# Patient Record
Sex: FEMALE | Employment: FULL TIME | URBAN - METROPOLITAN AREA
[De-identification: names, ages, dates, MRNs, and addresses within clinical notes are randomized per-mention and may not be internally consistent; named-entity substitution may affect disease eponyms.]

---

## 2018-10-10 ENCOUNTER — HOSPITAL ENCOUNTER (OUTPATIENT)
Age: 58
Setting detail: SPECIMEN
Discharge: HOME | End: 2018-10-10
Payer: COMMERCIAL

## 2018-10-10 LAB
-: ABNORMAL
AMORPHOUS: ABNORMAL
BACTERIA: ABNORMAL
CASTS UA: ABNORMAL /LPF (ref 0–8)
CRYSTALS, UA: ABNORMAL /HPF
EPITHELIAL CELLS UA: ABNORMAL /HPF (ref 0–5)
MUCUS: ABNORMAL
OTHER OBSERVATIONS UA: ABNORMAL
RBC UA: ABNORMAL /HPF (ref 0–4)
RENAL EPITHELIAL, UA: ABNORMAL /HPF
TRICHOMONAS: ABNORMAL
WBC UA: ABNORMAL /HPF (ref 0–5)
YEAST: ABNORMAL

## 2018-10-12 LAB
CULTURE: ABNORMAL
Lab: ABNORMAL
ORGANISM: ABNORMAL
SPECIMEN DESCRIPTION: ABNORMAL
STATUS: ABNORMAL

## 2019-03-26 ENCOUNTER — OFFICE VISIT (OUTPATIENT)
Dept: FAMILY MEDICINE CLINIC | Age: 59
End: 2019-03-26
Payer: COMMERCIAL

## 2019-03-26 VITALS
BODY MASS INDEX: 24.07 KG/M2 | DIASTOLIC BLOOD PRESSURE: 64 MMHG | WEIGHT: 141 LBS | OXYGEN SATURATION: 98 % | TEMPERATURE: 97.4 F | HEART RATE: 81 BPM | HEIGHT: 64 IN | RESPIRATION RATE: 16 BRPM | SYSTOLIC BLOOD PRESSURE: 110 MMHG

## 2019-03-26 DIAGNOSIS — F41.0 PANIC DISORDER: ICD-10-CM

## 2019-03-26 DIAGNOSIS — N39.41 URGE INCONTINENCE: ICD-10-CM

## 2019-03-26 DIAGNOSIS — G47.9 SLEEP DISTURBANCE: ICD-10-CM

## 2019-03-26 DIAGNOSIS — F41.9 ANXIETY: Primary | ICD-10-CM

## 2019-03-26 DIAGNOSIS — N32.81 OVERACTIVE BLADDER: ICD-10-CM

## 2019-03-26 PROCEDURE — 99203 OFFICE O/P NEW LOW 30 MIN: CPT | Performed by: INTERNAL MEDICINE

## 2019-03-26 RX ORDER — ZOLPIDEM TARTRATE 10 MG/1
TABLET ORAL
COMMUNITY
Start: 2019-02-28 | End: 2019-09-13 | Stop reason: SDUPTHER

## 2019-03-26 RX ORDER — OXYBUTYNIN CHLORIDE 10 MG/1
TABLET, EXTENDED RELEASE ORAL
COMMUNITY
Start: 2019-03-20 | End: 2020-03-31 | Stop reason: SDUPTHER

## 2019-03-26 RX ORDER — CITALOPRAM 20 MG/1
20 TABLET ORAL
COMMUNITY
Start: 2018-12-06 | End: 2019-03-26 | Stop reason: ALTCHOICE

## 2019-03-26 RX ORDER — BUSPIRONE HYDROCHLORIDE 15 MG/1
TABLET ORAL
COMMUNITY
Start: 2019-03-20 | End: 2019-03-26 | Stop reason: ALTCHOICE

## 2019-03-26 ASSESSMENT — PATIENT HEALTH QUESTIONNAIRE - PHQ9
2. FEELING DOWN, DEPRESSED OR HOPELESS: 0
SUM OF ALL RESPONSES TO PHQ QUESTIONS 1-9: 0
SUM OF ALL RESPONSES TO PHQ QUESTIONS 1-9: 0
SUM OF ALL RESPONSES TO PHQ9 QUESTIONS 1 & 2: 0
1. LITTLE INTEREST OR PLEASURE IN DOING THINGS: 0

## 2019-03-28 ENCOUNTER — TELEPHONE (OUTPATIENT)
Dept: FAMILY MEDICINE CLINIC | Age: 59
End: 2019-03-28

## 2019-03-28 RX ORDER — SERTRALINE HYDROCHLORIDE 25 MG/1
25 TABLET, FILM COATED ORAL DAILY
Qty: 30 TABLET | Refills: 3 | Status: SHIPPED | OUTPATIENT
Start: 2019-03-28 | End: 2019-04-23 | Stop reason: ALTCHOICE

## 2019-03-28 ASSESSMENT — ENCOUNTER SYMPTOMS
STRIDOR: 0
SHORTNESS OF BREATH: 0
CONSTIPATION: 0
COUGH: 0
DIARRHEA: 0
CHEST TIGHTNESS: 0
ABDOMINAL PAIN: 0
VOMITING: 0
NAUSEA: 0
BLOOD IN STOOL: 0

## 2019-04-23 ENCOUNTER — OFFICE VISIT (OUTPATIENT)
Dept: FAMILY MEDICINE CLINIC | Age: 59
End: 2019-04-23
Payer: COMMERCIAL

## 2019-04-23 VITALS
SYSTOLIC BLOOD PRESSURE: 128 MMHG | HEART RATE: 84 BPM | WEIGHT: 141 LBS | TEMPERATURE: 96.9 F | OXYGEN SATURATION: 97 % | HEIGHT: 64 IN | RESPIRATION RATE: 18 BRPM | BODY MASS INDEX: 24.07 KG/M2 | DIASTOLIC BLOOD PRESSURE: 62 MMHG

## 2019-04-23 DIAGNOSIS — G47.9 SLEEP DISTURBANCE: Primary | ICD-10-CM

## 2019-04-23 DIAGNOSIS — F41.0 PANIC DISORDER: ICD-10-CM

## 2019-04-23 DIAGNOSIS — F41.9 ANXIETY: ICD-10-CM

## 2019-04-23 PROCEDURE — 99213 OFFICE O/P EST LOW 20 MIN: CPT | Performed by: INTERNAL MEDICINE

## 2019-04-23 ASSESSMENT — ENCOUNTER SYMPTOMS
CHEST TIGHTNESS: 0
WHEEZING: 0
VOICE CHANGE: 0
DIARRHEA: 0
SORE THROAT: 0
ABDOMINAL PAIN: 0
SHORTNESS OF BREATH: 0
TROUBLE SWALLOWING: 0
STRIDOR: 0
CONSTIPATION: 0
CHOKING: 0

## 2019-04-23 NOTE — PROGRESS NOTES
Visit Information    Have you changed or started any medications since your last visit including any over-the-counter medicines, vitamins, or herbal medicines? no   Are you having any side effects from any of your medications? -  no  Have you stopped taking any of your medications? Is so, why? -  no    Have you seen any other physician or provider since your last visit? No  Have you had any other diagnostic tests since your last visit? No  Have you been seen in the emergency room and/or had an admission to a hospital since we last saw you? No  Have you had your routine dental cleaning in the past 6 months? no    Have you activated your [employer] account? If not, what are your barriers?  Yes     Patient Care Team:  Day Eduardo DO as PCP - General (Family Medicine)    Medical History Review  Past Medical, Family, and Social History reviewed and does contribute to the patient presenting condition    Health Maintenance   Topic Date Due    Hepatitis C screen  1960    HIV screen  03/04/1974    Lipid screen  03/04/1999    Colon cancer screen colonoscopy  03/04/2009    Flu vaccine (Season Ended) 09/26/2019 (Originally 9/1/2019)    Shingles Vaccine (1 of 2) 03/26/2020 (Originally 3/4/2009)    Breast cancer screen  12/31/2020    Cervical cancer screen  11/15/2021    DTaP/Tdap/Td vaccine (2 - Td) 11/28/2026    Pneumococcal 0-64 years Vaccine  Aged Out

## 2019-04-23 NOTE — PROGRESS NOTES
[address] [address]  Dept: [phone]  Dept Fax: [phone]    Leodis Brittle a 60 y.o. female who presents today for her medical conditions/complaints as notedbelow. Colette Lr is c/o of   Chief Complaint   Patient presents with    Anxiety     pt states she is stable on her anxiety medication          HPI:     Doing somewhat better in terms of anxiety on the zoloft 25 mg   Much better than she has ever done on any other anxiety /depression medications   A lot less panic attacks   No /si /hi   Home stressors hane not change and will not for for seeable future  No other issues or complaints         Still doing the ambien at bedtime as well , nightly started by sleep medicine       No results found for: LABA1C      ( goal A1C is < 7)   No results found for: LABMICR  No results found for: LDLCHOLESTEROL, LDLCALC    (goal LDL is <100)   No results found for: AST, ALT, BUN  BP Readings from Last 3 Encounters:   04/23/19 128/62   03/26/19 110/64          (goal 120/80)    Past Medical History:   Diagnosis Date    Acute insomnia     Anxiety     Depression     MDRO (multiple drug resistant organisms) resistance 10/10/2018    E. Coli urine    Overactive bladder       No past surgical history on file. Family History   Problem Relation Age of Onset    No Known Problems Mother     Cancer Father     No Known Problems Sister     Chronic Infections Brother     Deep Vein Thrombosis Brother        Social History     Tobacco Use    Smoking status: Never Smoker    Smokeless tobacco: Never Used   Substance Use Topics    Alcohol use:  Yes     Alcohol/week: 3.6 oz     Types: 6 Cans of beer per week      Current Outpatient Medications   Medication Sig Dispense Refill    sertraline (ZOLOFT) 50 MG tablet Take 1 tablet by mouth daily 30 tablet 5    oxybutynin (DITROPAN XL) 10 MG extended release tablet Use two tablets daily Pulmonary/Chest: Effort normal and breath sounds normal.   Abdominal: Soft. Bowel sounds are normal. There is no splenomegaly or hepatomegaly. There is no tenderness. Neurological: She is alert and oriented to person, place, and time. No cranial nerve deficit. Skin: Skin is warm and dry. No rash noted. Psychiatric: She has a normal mood and affect. Her speech is normal and behavior is normal. Judgment normal. Thought content is not delusional. Cognition and memory are normal. She expresses no suicidal plans and no homicidal plans. Nursing note and vitals reviewed. /62 (Site: Left Upper Arm, Position: Sitting, Cuff Size: Medium Adult)   Pulse 84   Temp 96.9 °F (36.1 °C) (Tympanic)   Resp 18   Ht 5' 4\" (1.626 m)   Wt 141 lb (64 kg)   SpO2 97%   BMI 24.20 kg/m²     Assessment:       Diagnosis Orders   1. Sleep disturbance     2. Anxiety     3. Panic disorder               Plan:       Return in about 3 months (around 7/23/2019), or if symptoms worsen or fail to improve, for anxiety med check . No orders of the defined types were placed in this encounter. Orders Placed This Encounter   Medications    sertraline (ZOLOFT) 50 MG tablet     Sig: Take 1 tablet by mouth daily     Dispense:  30 tablet     Refill:  5    Increase zoloft to 50 mg once daily   Follow up in 3 months for med check     CAN fill ambien  for you as well if needed. Follow up with specialists as scheduled. Call with questions or concerns. Patientgiven educational materials - see patient instructions. Discussed use, benefit,and side effects of prescribed medications. All patient questions answered. Ptvoiced understanding. Reviewed health maintenance. Instructed to continue currentmedications, diet and exercise. Patient agreed with treatment plan. Follow up asdirected.      Electronically signed by Mark Peña DO on 4/23/2019 at 3:36 PM

## 2019-05-02 ENCOUNTER — TELEPHONE (OUTPATIENT)
Dept: FAMILY MEDICINE CLINIC | Age: 59
End: 2019-05-02

## 2019-05-02 NOTE — TELEPHONE ENCOUNTER
Pt is asking if she can start taking the sertraline 50mg bid? States she's still having some anxiety.  Please advise

## 2019-05-29 ENCOUNTER — TELEPHONE (OUTPATIENT)
Dept: FAMILY MEDICINE CLINIC | Age: 59
End: 2019-05-29

## 2019-05-29 RX ORDER — SERTRALINE HYDROCHLORIDE 100 MG/1
100 TABLET, FILM COATED ORAL DAILY
Qty: 30 TABLET | Refills: 3 | Status: SHIPPED | OUTPATIENT
Start: 2019-05-29 | End: 2019-07-30 | Stop reason: SDUPTHER

## 2019-07-30 ENCOUNTER — OFFICE VISIT (OUTPATIENT)
Dept: FAMILY MEDICINE CLINIC | Age: 59
End: 2019-07-30
Payer: COMMERCIAL

## 2019-07-30 VITALS
DIASTOLIC BLOOD PRESSURE: 70 MMHG | WEIGHT: 135 LBS | SYSTOLIC BLOOD PRESSURE: 118 MMHG | HEART RATE: 79 BPM | HEIGHT: 64 IN | OXYGEN SATURATION: 100 % | BODY MASS INDEX: 23.05 KG/M2

## 2019-07-30 DIAGNOSIS — R92.8 ABNORMAL MAMMOGRAM: ICD-10-CM

## 2019-07-30 DIAGNOSIS — F41.9 ANXIETY: Primary | ICD-10-CM

## 2019-07-30 DIAGNOSIS — G47.9 SLEEP DISTURBANCE: ICD-10-CM

## 2019-07-30 DIAGNOSIS — N63.0 BREAST LUMP IN LOWER OUTER QUADRANT: ICD-10-CM

## 2019-07-30 DIAGNOSIS — F41.0 PANIC DISORDER: ICD-10-CM

## 2019-07-30 PROCEDURE — 99213 OFFICE O/P EST LOW 20 MIN: CPT | Performed by: INTERNAL MEDICINE

## 2019-07-30 RX ORDER — SERTRALINE HYDROCHLORIDE 100 MG/1
100 TABLET, FILM COATED ORAL DAILY
Qty: 30 TABLET | Refills: 11 | Status: SHIPPED | OUTPATIENT
Start: 2019-07-30 | End: 2020-01-28 | Stop reason: SDUPTHER

## 2019-07-31 ENCOUNTER — TELEPHONE (OUTPATIENT)
Dept: FAMILY MEDICINE CLINIC | Age: 59
End: 2019-07-31

## 2019-07-31 DIAGNOSIS — R92.8 ABNORMAL MAMMOGRAM: Primary | ICD-10-CM

## 2019-07-31 DIAGNOSIS — N63.0 BREAST LUMP IN LOWER OUTER QUADRANT: ICD-10-CM

## 2019-08-01 ASSESSMENT — ENCOUNTER SYMPTOMS
DIARRHEA: 0
CONSTIPATION: 0
ABDOMINAL PAIN: 0

## 2019-08-01 NOTE — PROGRESS NOTES
[facility]  [address] [address]  Dept: [phone]  Dept Fax: [phone]    Guillermo Rosenberg a 60 y.o. female who presents today for her medical conditions/complaints as notedbelow. Emilie Clayton is c/o of   Chief Complaint   Patient presents with    Anxiety     Patient is here for follow up on anxiety    Breast Mass     Patient found a  mass in the right breast         HPI:     HPI    No results found for: LABA1C      ( goal A1C is < 7)   No results found for: LABMICR  No results found for: LDLCHOLESTEROL, [address]    (goal LDL is <100)   No results found for: AST, ALT, BUN  BP Readings from Last 3 Encounters:   07/30/19 118/70   04/23/19 128/62   03/26/19 110/64          (goal 120/80)    Past Medical History:   Diagnosis Date    Acute insomnia     Anxiety     Depression     MDRO (multiple drug resistant organisms) resistance 10/10/2018    E. Coli urine    Overactive bladder       History reviewed. No pertinent surgical history. Family History   Problem Relation Age of Onset    No Known Problems Mother     Cancer Father     No Known Problems Sister     Chronic Infections Brother     Deep Vein Thrombosis Brother        Social History     Tobacco Use    Smoking status: Never Smoker    Smokeless tobacco: Never Used   Substance Use Topics    Alcohol use: Yes     Alcohol/week: 6.0 standard drinks     Types: 6 Cans of beer per week      Current Outpatient Medications   Medication Sig Dispense Refill    sertraline (ZOLOFT) 100 MG tablet Take 1 tablet by mouth daily 30 tablet 11    oxybutynin (DITROPAN XL) 10 MG extended release tablet Use two tablets daily      zolpidem (AMBIEN) 10 MG tablet        No current facility-administered medications for this visit.       No Known Allergies       Health Maintenance   Topic Date Due    Hepatitis C screen  1960    HIV screen  03/04/1974    Lipid screen  03/04/1999    Colon cancer screen colonoscopy  03/04/2009    Flu vaccine (1) 09/26/2019 (Originally 9/1/2019)    Shingles Vaccine (1 of 2) 03/26/2020 (Originally 3/4/2009)    Breast cancer screen  12/31/2020    Cervical cancer screen  11/15/2021    DTaP/Tdap/Td vaccine (2 - Td) 11/28/2026    Pneumococcal 0-64 years Vaccine  Aged Out       Subjective:     Review of Systems   Constitutional: Negative for fatigue, fever and unexpected weight change. Eyes: Negative for visual disturbance. Cardiovascular: Negative for chest pain. Gastrointestinal: Negative for abdominal pain, constipation and diarrhea. Musculoskeletal: Negative for arthralgias. Skin: Negative for rash. Neurological: Negative for headaches. Psychiatric/Behavioral: Negative for sleep disturbance. Objective:      Physical Exam   Constitutional: She is oriented to person, place, and time. She appears well-developed and well-nourished. No distress. Cardiovascular: Normal rate, regular rhythm, normal heart sounds and intact distal pulses. Pulmonary/Chest: Effort normal and breath sounds normal. No stridor. No respiratory distress. She has no wheezes. Neurological: She is alert and oriented to person, place, and time. Skin: She is not diaphoretic. Psychiatric: Her speech is normal and behavior is normal. Judgment normal. Her mood appears anxious. Thought content is not delusional. Cognition and memory are normal. She does not exhibit a depressed mood. She expresses no suicidal plans and no homicidal plans. Nursing note and vitals reviewed. /70   Pulse 79   Ht 5' 4\" (1.626 m)   Wt 135 lb (61.2 kg)   SpO2 100%   BMI 23.17 kg/m²     Assessment:       Diagnosis Orders   1. Anxiety     2. Sleep disturbance     3. Panic disorder     4. Abnormal mammogram  [facility] W OR WO CAD BILATERAL    US BREAST LIMITED LEFT   5.  Breast lump in lower outer quadrant  [facility] W OR WO CAD BILATERAL    US BREAST LIMITED LEFT             Plan:       Return in about 6 months (around 1/30/2020), or if symptoms worsen or fail to improve, for med check . Orders Placed This Encounter   Procedures    [facility] W OR WO CAD BILATERAL     Standing Status:   Future     Standing Expiration Date:   9/30/2020     BREAST LIMITED LEFT     Standing Status:   Future     Standing Expiration Date:   7/30/2020     Orders Placed This Encounter   Medications    sertraline (ZOLOFT) 100 MG tablet     Sig: Take 1 tablet by mouth daily     Dispense:  30 tablet     Refill:  11       Patientgiven educational materials - see patient instructions. Discussed use, benefit,and side effects of prescribed medications. All patient questions answered. Ptvoiced understanding. Reviewed health maintenance. Instructed to continue currentmedications, diet and exercise. Patient agreed with treatment plan. Follow up asdirected.      Electronically signed by Ernesto Flores DO on 8/1/2019 at 1:47 PM

## 2019-08-05 ENCOUNTER — HOSPITAL ENCOUNTER (OUTPATIENT)
Dept: ULTRASOUND IMAGING | Age: 59
Discharge: HOME | End: 2019-08-07
Payer: COMMERCIAL

## 2019-08-05 ENCOUNTER — HOSPITAL ENCOUNTER (OUTPATIENT)
Dept: MAMMOGRAPHY | Age: 59
Discharge: HOME | End: 2019-08-07
Payer: COMMERCIAL

## 2019-08-05 DIAGNOSIS — N63.0 BREAST LUMP IN LOWER OUTER QUADRANT: ICD-10-CM

## 2019-08-05 DIAGNOSIS — R92.8 ABNORMAL MAMMOGRAM: ICD-10-CM

## 2019-08-05 PROCEDURE — G0279 TOMOSYNTHESIS, MAMMO: HCPCS

## 2019-08-05 PROCEDURE — 76642 ULTRASOUND BREAST LIMITED: CPT

## 2019-08-06 DIAGNOSIS — R92.8 ABNORMAL MAMMOGRAM: Primary | ICD-10-CM

## 2019-08-16 ENCOUNTER — HOSPITAL ENCOUNTER (OUTPATIENT)
Dept: ULTRASOUND IMAGING | Age: 59
Discharge: HOME | End: 2019-08-18
Payer: COMMERCIAL

## 2019-08-16 ENCOUNTER — HOSPITAL ENCOUNTER (OUTPATIENT)
Dept: MAMMOGRAPHY | Age: 59
Discharge: HOME | End: 2019-08-18
Payer: COMMERCIAL

## 2019-08-16 VITALS — HEART RATE: 84 BPM

## 2019-08-16 DIAGNOSIS — Z98.890 STATUS POST BREAST BIOPSY: ICD-10-CM

## 2019-08-16 DIAGNOSIS — R92.8 ABNORMAL MAMMOGRAM: ICD-10-CM

## 2019-08-16 PROCEDURE — 88305 TISSUE EXAM BY PATHOLOGIST: CPT

## 2019-08-16 PROCEDURE — 88377 M/PHMTRC ALYS ISHQUANT/SEMIQ: CPT

## 2019-08-16 PROCEDURE — 19083 BX BREAST 1ST LESION US IMAG: CPT

## 2019-08-16 PROCEDURE — 77065 DX MAMMO INCL CAD UNI: CPT

## 2019-08-16 PROCEDURE — 88360 TUMOR IMMUNOHISTOCHEM/MANUAL: CPT

## 2019-08-19 LAB — SURGICAL PATHOLOGY REPORT: NORMAL

## 2019-08-20 ENCOUNTER — OFFICE VISIT (OUTPATIENT)
Dept: FAMILY MEDICINE CLINIC | Age: 59
End: 2019-08-20
Payer: COMMERCIAL

## 2019-08-20 VITALS
HEIGHT: 64 IN | WEIGHT: 134 LBS | OXYGEN SATURATION: 97 % | TEMPERATURE: 100 F | HEART RATE: 81 BPM | SYSTOLIC BLOOD PRESSURE: 110 MMHG | RESPIRATION RATE: 16 BRPM | BODY MASS INDEX: 22.88 KG/M2 | DIASTOLIC BLOOD PRESSURE: 60 MMHG

## 2019-08-20 DIAGNOSIS — C80.1 DUCTAL CARCINOMA (HCC): Primary | ICD-10-CM

## 2019-08-20 PROCEDURE — 99213 OFFICE O/P EST LOW 20 MIN: CPT | Performed by: INTERNAL MEDICINE

## 2019-08-20 ASSESSMENT — ENCOUNTER SYMPTOMS
CONSTIPATION: 0
ABDOMINAL PAIN: 0
WHEEZING: 0
CHEST TIGHTNESS: 0
COUGH: 0
SHORTNESS OF BREATH: 0
STRIDOR: 0
DIARRHEA: 0
CHOKING: 0

## 2019-08-22 ENCOUNTER — TELEPHONE (OUTPATIENT)
Dept: FAMILY MEDICINE CLINIC | Age: 59
End: 2019-08-22

## 2019-08-22 ENCOUNTER — TELEPHONE (OUTPATIENT)
Dept: ONCOLOGY | Age: 59
End: 2019-08-22

## 2019-08-22 DIAGNOSIS — R92.8 ABNORMAL MAMMOGRAM: ICD-10-CM

## 2019-08-22 DIAGNOSIS — C80.1 DUCTAL CARCINOMA (HCC): Primary | ICD-10-CM

## 2019-08-22 NOTE — TELEPHONE ENCOUNTER
Pt called stating that she is still in a good amount of pain from the biopsy. She has tried tylenol and motrin. She is wanting to know if you could call her something else over to the [facility] in [place]. She is seeing Dr. Joe Liu next Friday.

## 2019-08-23 RX ORDER — TRAMADOL HYDROCHLORIDE 50 MG/1
50 TABLET ORAL EVERY 6 HOURS PRN
Qty: 12 TABLET | Refills: 0 | Status: SHIPPED | OUTPATIENT
Start: 2019-08-23 | End: 2019-08-26

## 2019-09-13 DIAGNOSIS — G47.9 SLEEP DISTURBANCE: Primary | ICD-10-CM

## 2019-09-13 RX ORDER — ZOLPIDEM TARTRATE 10 MG/1
10 TABLET ORAL NIGHTLY PRN
Qty: 30 TABLET | Refills: 5 | Status: SHIPPED | OUTPATIENT
Start: 2019-09-13 | End: 2020-04-09 | Stop reason: SDUPTHER

## 2019-09-19 ENCOUNTER — HOSPITAL ENCOUNTER (OUTPATIENT)
Age: 59
Discharge: HOME | End: 2019-09-21
Payer: COMMERCIAL

## 2019-09-19 ENCOUNTER — HOSPITAL ENCOUNTER (OUTPATIENT)
Dept: GENERAL RADIOLOGY | Age: 59
Discharge: HOME | End: 2019-09-21
Payer: COMMERCIAL

## 2019-09-19 ENCOUNTER — HOSPITAL ENCOUNTER (OUTPATIENT)
Age: 59
Discharge: HOME | End: 2019-09-19
Payer: COMMERCIAL

## 2019-09-19 DIAGNOSIS — C50.511 MALIGNANT NEOPLASM OF LOWER-OUTER QUADRANT OF RIGHT FEMALE BREAST, UNSPECIFIED ESTROGEN RECEPTOR STATUS (HCC): ICD-10-CM

## 2019-09-19 PROCEDURE — 71046 X-RAY EXAM CHEST 2 VIEWS: CPT

## 2019-09-24 ENCOUNTER — TELEPHONE (OUTPATIENT)
Dept: FAMILY MEDICINE CLINIC | Age: 59
End: 2019-09-24

## 2019-09-24 DIAGNOSIS — C80.1 DUCTAL CARCINOMA (HCC): Primary | ICD-10-CM

## 2019-09-24 DIAGNOSIS — G47.9 SLEEP DISTURBANCE: ICD-10-CM

## 2019-09-24 RX ORDER — LORAZEPAM 1 MG/1
1 TABLET ORAL ONCE
Qty: 2 TABLET | Refills: 0 | Status: SHIPPED | OUTPATIENT
Start: 2019-09-24 | End: 2019-09-24

## 2019-09-25 NOTE — TELEPHONE ENCOUNTER
I know she has tried buspar in the past and that does not help   If she tries a half dose of the ativan the day before and she does okay with it ie not groggy drowsy then she should be able to go herself I would just recommend doing the half dose the day of test as well.

## 2019-09-27 ENCOUNTER — HOSPITAL ENCOUNTER (OUTPATIENT)
Dept: MRI IMAGING | Age: 59
Discharge: HOME | End: 2019-09-29
Payer: COMMERCIAL

## 2019-09-27 DIAGNOSIS — C50.511 MALIGNANT NEOPLASM OF LOWER-OUTER QUADRANT OF RIGHT FEMALE BREAST, UNSPECIFIED ESTROGEN RECEPTOR STATUS (HCC): ICD-10-CM

## 2019-09-27 LAB
CREAT SERPL-MCNC: 0.74 MG/DL (ref 0.5–0.9)
GFR AFRICAN AMERICAN: >60 ML/MIN
GFR NON-AFRICAN AMERICAN: >60 ML/MIN
GFR SERPL CREATININE-BSD FRML MDRD: NORMAL ML/MIN/{1.73_M2}
GFR SERPL CREATININE-BSD FRML MDRD: NORMAL ML/MIN/{1.73_M2}

## 2019-09-27 PROCEDURE — 82565 ASSAY OF CREATININE: CPT

## 2019-09-27 PROCEDURE — 77049 MRI BREAST C-+ W/CAD BI: CPT

## 2019-09-27 PROCEDURE — 2580000003 HC RX 258: Performed by: SURGERY

## 2019-09-27 PROCEDURE — 6360000004 HC RX CONTRAST MEDICATION: Performed by: SURGERY

## 2019-09-27 PROCEDURE — A9579 GAD-BASE MR CONTRAST NOS,1ML: HCPCS | Performed by: SURGERY

## 2019-09-27 PROCEDURE — 36415 COLL VENOUS BLD VENIPUNCTURE: CPT

## 2019-09-27 RX ORDER — SODIUM CHLORIDE 0.9 % (FLUSH) 0.9 %
10 SYRINGE (ML) INJECTION PRN
Status: DISCONTINUED | OUTPATIENT
Start: 2019-09-27 | End: 2019-09-30 | Stop reason: HOSPADM

## 2019-09-27 RX ORDER — 0.9 % SODIUM CHLORIDE 0.9 %
50 INTRAVENOUS SOLUTION INTRAVENOUS ONCE
Status: COMPLETED | OUTPATIENT
Start: 2019-09-27 | End: 2019-09-27

## 2019-09-27 RX ADMIN — Medication 10 ML: at 18:11

## 2019-09-27 RX ADMIN — GADOTERIDOL 13 ML: 279.3 INJECTION, SOLUTION INTRAVENOUS at 18:11

## 2019-09-27 RX ADMIN — SODIUM CHLORIDE 50 ML: 9 INJECTION, SOLUTION INTRAVENOUS at 18:11

## 2019-10-04 ENCOUNTER — TELEPHONE (OUTPATIENT)
Dept: FAMILY MEDICINE CLINIC | Age: 59
End: 2019-10-04

## 2019-10-04 DIAGNOSIS — F41.9 ANXIETY: ICD-10-CM

## 2019-10-04 DIAGNOSIS — C80.1 DUCTAL CARCINOMA (HCC): Primary | ICD-10-CM

## 2019-10-04 DIAGNOSIS — F41.0 PANIC DISORDER: ICD-10-CM

## 2019-10-04 DIAGNOSIS — G47.9 SLEEP DISTURBANCE: ICD-10-CM

## 2019-12-05 ENCOUNTER — HOSPITAL ENCOUNTER (OUTPATIENT)
Age: 59
Setting detail: SPECIMEN
Discharge: HOME | End: 2019-12-05
Payer: COMMERCIAL

## 2020-01-27 ENCOUNTER — TELEPHONE (OUTPATIENT)
Dept: FAMILY MEDICINE CLINIC | Age: 60
End: 2020-01-27

## 2020-01-27 RX ORDER — OSELTAMIVIR PHOSPHATE 75 MG/1
75 CAPSULE ORAL 2 TIMES DAILY
Qty: 10 CAPSULE | Refills: 0 | Status: SHIPPED | OUTPATIENT
Start: 2020-01-27 | End: 2020-01-28

## 2020-01-27 NOTE — TELEPHONE ENCOUNTER
Patient called stating her  was admitted to [facility] on 1/25 and was positive for flu A. She is requesting tamiflu for herself. She has no symptoms. Please advise.  Thank you

## 2020-01-28 ENCOUNTER — OFFICE VISIT (OUTPATIENT)
Dept: FAMILY MEDICINE CLINIC | Age: 60
End: 2020-01-28
Payer: COMMERCIAL

## 2020-01-28 VITALS
RESPIRATION RATE: 16 BRPM | TEMPERATURE: 97.1 F | HEART RATE: 83 BPM | DIASTOLIC BLOOD PRESSURE: 58 MMHG | OXYGEN SATURATION: 97 % | BODY MASS INDEX: 23.12 KG/M2 | WEIGHT: 135.4 LBS | HEIGHT: 64 IN | SYSTOLIC BLOOD PRESSURE: 108 MMHG

## 2020-01-28 LAB — PAP SMEAR, EXTERNAL: NORMAL

## 2020-01-28 PROCEDURE — 99213 OFFICE O/P EST LOW 20 MIN: CPT | Performed by: INTERNAL MEDICINE

## 2020-01-28 RX ORDER — LETROZOLE 2.5 MG/1
2.5 TABLET, FILM COATED ORAL
COMMUNITY
Start: 2019-08-30

## 2020-01-28 RX ORDER — ZOLPIDEM TARTRATE 10 MG/1
TABLET ORAL
COMMUNITY
Start: 2020-01-19 | End: 2020-03-23 | Stop reason: SDUPTHER

## 2020-01-28 RX ORDER — SERTRALINE HYDROCHLORIDE 100 MG/1
200 TABLET, FILM COATED ORAL DAILY
Qty: 60 TABLET | Refills: 5 | Status: SHIPPED | OUTPATIENT
Start: 2020-01-28 | End: 2020-03-31 | Stop reason: SDUPTHER

## 2020-01-28 ASSESSMENT — PATIENT HEALTH QUESTIONNAIRE - PHQ9
SUM OF ALL RESPONSES TO PHQ QUESTIONS 1-9: 0
SUM OF ALL RESPONSES TO PHQ QUESTIONS 1-9: 0
SUM OF ALL RESPONSES TO PHQ9 QUESTIONS 1 & 2: 0
1. LITTLE INTEREST OR PLEASURE IN DOING THINGS: 0
2. FEELING DOWN, DEPRESSED OR HOPELESS: 0

## 2020-01-28 NOTE — PROGRESS NOTES
Visit Information    Have you changed or started any medications since your last visit including any over-the-counter medicines, vitamins, or herbal medicines? no   Are you having any side effects from any of your medications? -  no  Have you stopped taking any of your medications? Is so, why? -  no    Have you seen any other physician or provider since your last visit? Yes - Records Obtained  Have you had any other diagnostic tests since your last visit? Yes - Records Obtained  Have you been seen in the emergency room and/or had an admission to a hospital since we last saw you? No  Have you had your routine dental cleaning in the past 6 months? no    Have you activated your [employer] account? If not, what are your barriers?  Yes     Patient Care Team:  Cedric Balderas DO as PCP - General (Family Medicine)  Cedric Balderas DO as PCP - [facility]    Medical History Review  Past Medical, Family, and Social History reviewed and does contribute to the patient presenting condition    Health Maintenance   Topic Date Due    Hepatitis C screen  1960    HIV screen  03/04/1974    Lipid screen  03/04/1999    Colon cancer screen colonoscopy  03/04/2009    Flu vaccine (1) 09/01/2019    Shingles Vaccine (1 of 2) 03/26/2020 (Originally 3/4/2009)    Breast cancer screen  08/16/2021    Cervical cancer screen  11/15/2021    DTaP/Tdap/Td vaccine (2 - Td) 11/28/2026    Pneumococcal 0-64 years Vaccine  Aged Out

## 2020-01-29 ASSESSMENT — ENCOUNTER SYMPTOMS
WHEEZING: 0
COUGH: 0
CHOKING: 0
CONSTIPATION: 0
ABDOMINAL PAIN: 0
DIARRHEA: 0
STRIDOR: 0
VOMITING: 0
RECTAL PAIN: 0
SHORTNESS OF BREATH: 0
BLOOD IN STOOL: 0
ANAL BLEEDING: 0
CHEST TIGHTNESS: 0
NAUSEA: 0

## 2020-01-29 NOTE — PROGRESS NOTES
[facility]) 2.5 MG tablet Take 2.5 mg by mouth      sertraline (ZOLOFT) 100 MG tablet Take 2 tablets by mouth daily 60 tablet 5    zolpidem (AMBIEN) 10 MG tablet       oxybutynin (DITROPAN XL) 10 MG extended release tablet Use two tablets daily       No current facility-administered medications for this visit. No Known Allergies       Health Maintenance   Topic Date Due    Hepatitis C screen  1960    HIV screen  03/04/1974    Lipid screen  03/04/1999    Colon cancer screen colonoscopy  03/04/2009    Shingles Vaccine (1 of 2) 03/26/2020 (Originally 3/4/2009)    Flu vaccine (1) 01/28/2021 (Originally 9/1/2019)    Breast cancer screen  08/16/2021    Cervical cancer screen  11/15/2021    DTaP/Tdap/Td vaccine (2 - Td) 11/28/2026    Pneumococcal 0-64 years Vaccine  Aged Out       Subjective:     Review of Systems   Constitutional: Negative for activity change, appetite change, chills, diaphoresis, fatigue, fever and unexpected weight change. Eyes: Negative for visual disturbance. Respiratory: Negative for cough, choking, chest tightness, shortness of breath, wheezing and stridor. Cardiovascular: Negative for chest pain, palpitations and leg swelling. Gastrointestinal: Negative for abdominal pain, anal bleeding, blood in stool, constipation, diarrhea, nausea, rectal pain and vomiting. Musculoskeletal: Negative for arthralgias. Skin: Negative for rash. Neurological: Negative for headaches. Hematological: Negative for adenopathy. Psychiatric/Behavioral: Negative for sleep disturbance. The patient is nervous/anxious. Objective:      Physical Exam  Vitals signs and nursing note reviewed. Constitutional:       General: She is not in acute distress. Appearance: She is well-developed. She is not ill-appearing or toxic-appearing.    HENT:      Right Ear: External ear normal.      Left Ear: External ear normal.      Nose: Nose normal.   Eyes:      Pupils: Pupils are equal, round, and reactive to light. Neck:      Musculoskeletal: Normal range of motion and neck supple. Cardiovascular:      Rate and Rhythm: Normal rate and regular rhythm. Pulses: Normal pulses. Heart sounds: Normal heart sounds. No murmur. No gallop. Pulmonary:      Effort: Pulmonary effort is normal.      Breath sounds: Normal breath sounds. Abdominal:      General: Bowel sounds are normal.      Palpations: Abdomen is soft. There is no hepatomegaly or splenomegaly. Tenderness: There is no abdominal tenderness. Skin:     General: Skin is warm and dry. Findings: No rash. Neurological:      Mental Status: She is alert and oriented to person, place, and time. Cranial Nerves: No cranial nerve deficit. Psychiatric:         Attention and Perception: Attention normal.         Mood and Affect: Mood is anxious. Speech: Speech is rapid and pressured. Behavior: Behavior normal.         Thought Content: Thought content is not delusional. Thought content does not include homicidal or suicidal plan. Cognition and Memory: Cognition normal.         Judgment: Judgment normal.       BP (!) 108/58 (Site: Left Upper Arm, Position: Sitting, Cuff Size: Medium Adult)   Pulse 83   Temp 97.1 °F (36.2 °C) (Tympanic)   Resp 16   Ht 5' 4\" (1.626 m)   Wt 135 lb 6.4 oz (61.4 kg)   SpO2 97%   BMI 23.24 kg/m²     Assessment:       Diagnosis Orders   1. Anxiety     2. Sleep disturbance     3. Well adult exam               Plan:       Return in about 1 year (around 1/28/2021), or if symptoms worsen or fail to improve. No orders of the defined types were placed in this encounter.     Orders Placed This Encounter   Medications    sertraline (ZOLOFT) 100 MG tablet     Sig: Take 2 tablets by mouth daily     Dispense:  60 tablet     Refill:  5    Increase zoloft to 200 mg   Can consider vistaril as option as well   Reviewed what she has failed     Reviewed recent labs and specialists notes  Follow up with specialists  Call with q/c      Patientgiven educational materials - see patient instructions. Discussed use, benefit,and side effects of prescribed medications. All patient questions answered. Ptvoiced understanding. Reviewed health maintenance. Instructed to continue currentmedications, diet and exercise. Patient agreed with treatment plan. Follow up asdirected.      Electronically signed by Timmy Mckeon DO on 1/29/2020 at 1:17 PM

## 2020-02-12 LAB — SURGICAL PATHOLOGY REPORT: NORMAL

## 2020-03-23 RX ORDER — ZOLPIDEM TARTRATE 10 MG/1
5 TABLET ORAL NIGHTLY PRN
Qty: 30 TABLET | Refills: 3 | Status: SHIPPED | OUTPATIENT
Start: 2020-03-23 | End: 2020-04-09 | Stop reason: SDUPTHER

## 2020-03-31 RX ORDER — SERTRALINE HYDROCHLORIDE 100 MG/1
200 TABLET, FILM COATED ORAL DAILY
Qty: 60 TABLET | Refills: 5 | Status: SHIPPED | OUTPATIENT
Start: 2020-03-31 | End: 2020-05-10 | Stop reason: SDUPTHER

## 2020-03-31 RX ORDER — OXYBUTYNIN CHLORIDE 10 MG/1
TABLET, EXTENDED RELEASE ORAL
Qty: 30 TABLET | Refills: 5 | Status: SHIPPED | OUTPATIENT
Start: 2020-03-31 | End: 2020-06-18 | Stop reason: SDUPTHER

## 2020-04-09 ENCOUNTER — TELEPHONE (OUTPATIENT)
Dept: FAMILY MEDICINE CLINIC | Age: 60
End: 2020-04-09

## 2020-04-09 RX ORDER — ZOLPIDEM TARTRATE 10 MG/1
10 TABLET ORAL NIGHTLY PRN
Qty: 30 TABLET | Refills: 5 | Status: SHIPPED | OUTPATIENT
Start: 2020-04-09 | End: 2020-05-09

## 2020-05-10 RX ORDER — SERTRALINE HYDROCHLORIDE 100 MG/1
200 TABLET, FILM COATED ORAL DAILY
Qty: 60 TABLET | Refills: 5 | Status: SHIPPED | OUTPATIENT
Start: 2020-05-10 | End: 2020-09-08 | Stop reason: SDUPTHER

## 2020-05-19 ENCOUNTER — TELEMEDICINE (OUTPATIENT)
Dept: FAMILY MEDICINE CLINIC | Age: 60
End: 2020-05-19
Payer: COMMERCIAL

## 2020-05-19 VITALS — WEIGHT: 135 LBS | HEIGHT: 64 IN | BODY MASS INDEX: 23.05 KG/M2

## 2020-05-19 PROCEDURE — 99213 OFFICE O/P EST LOW 20 MIN: CPT | Performed by: INTERNAL MEDICINE

## 2020-05-19 RX ORDER — PREDNISONE 20 MG/1
TABLET ORAL
Qty: 18 TABLET | Refills: 0 | Status: SHIPPED | OUTPATIENT
Start: 2020-05-19 | End: 2020-05-29

## 2020-05-19 RX ORDER — BACLOFEN 10 MG/1
10 TABLET ORAL 3 TIMES DAILY PRN
Qty: 30 TABLET | Refills: 0 | Status: SHIPPED | OUTPATIENT
Start: 2020-05-19 | End: 2021-01-21 | Stop reason: ALTCHOICE

## 2020-05-20 ASSESSMENT — ENCOUNTER SYMPTOMS
DIARRHEA: 0
ABDOMINAL PAIN: 0
CHEST TIGHTNESS: 0
CONSTIPATION: 0
COLOR CHANGE: 0
COUGH: 0
BACK PAIN: 0
CHOKING: 0

## 2020-05-20 NOTE — PROGRESS NOTES
2020    TELEHEALTH EVALUATION -- Audio/Visual (During WFQCF-06 public health emergency)    HPI:    Ida Caballero (:  1960) has requested an audio/video evaluation for the following concern(s):        Review of Systems   Constitutional: Positive for activity change. Negative for appetite change, chills, diaphoresis, fatigue, fever and unexpected weight change. Eyes: Negative for visual disturbance. Respiratory: Negative for cough, choking and chest tightness. Cardiovascular: Negative for chest pain. Gastrointestinal: Negative for abdominal pain, constipation and diarrhea. Musculoskeletal: Positive for arthralgias, joint swelling and neck pain. Negative for back pain, gait problem, myalgias and neck stiffness. Skin: Negative for color change, pallor, rash and wound. Neurological: Positive for weakness and numbness. Negative for dizziness, seizures, syncope, speech difficulty, light-headedness and headaches. Psychiatric/Behavioral: Negative for sleep disturbance. Prior to Visit Medications    Medication Sig Taking? Authorizing Provider   predniSONE (DELTASONE) 20 MG tablet 3 tabs x 3 days, then 2 tabs x 3 days, then 1 tab x 3 days Yes Lavone , DO   baclofen (LIORESAL) 10 MG tablet Take 1 tablet by mouth 3 times daily as needed (muscle spasm) Yes Lavone , DO   sertraline (ZOLOFT) 100 MG tablet Take 2 tablets by mouth daily  Tanya Florene Later, DO   oxybutynin (DITROPAN XL) 10 MG extended release tablet Use two tablets daily  Tanya Florene Later, DO   letrozole (FEMARA) 2.5 MG tablet Take 2.5 mg by mouth  Historical Provider, MD       Social History     Tobacco Use    Smoking status: Never Smoker    Smokeless tobacco: Never Used   Substance Use Topics    Alcohol use:  Yes     Alcohol/week: 6.0 standard drinks     Types: 6 Cans of beer per week    Drug use: Never        No Known Allergies,   Past Medical History:   Diagnosis Date    Acute insomnia     Anxiety     Depression     MDRO

## 2020-06-18 RX ORDER — OXYBUTYNIN CHLORIDE 10 MG/1
TABLET, EXTENDED RELEASE ORAL
Qty: 30 TABLET | Refills: 5 | Status: SHIPPED | OUTPATIENT
Start: 2020-06-18 | End: 2020-10-05 | Stop reason: SDUPTHER

## 2020-08-31 ENCOUNTER — HOSPITAL ENCOUNTER (OUTPATIENT)
Dept: MAMMOGRAPHY | Age: 60
Discharge: HOME | End: 2020-09-02
Payer: COMMERCIAL

## 2020-08-31 PROCEDURE — 77063 BREAST TOMOSYNTHESIS BI: CPT

## 2020-09-08 RX ORDER — SERTRALINE HYDROCHLORIDE 100 MG/1
200 TABLET, FILM COATED ORAL DAILY
Qty: 60 TABLET | Refills: 5 | Status: SHIPPED | OUTPATIENT
Start: 2020-09-08 | End: 2021-02-22 | Stop reason: SDUPTHER

## 2020-10-05 RX ORDER — ZOLPIDEM TARTRATE 10 MG/1
10 TABLET ORAL NIGHTLY PRN
COMMUNITY
End: 2020-10-05 | Stop reason: SDUPTHER

## 2020-10-05 RX ORDER — OXYBUTYNIN CHLORIDE 10 MG/1
TABLET, EXTENDED RELEASE ORAL
Qty: 30 TABLET | Refills: 5 | Status: SHIPPED | OUTPATIENT
Start: 2020-10-05 | End: 2021-02-15 | Stop reason: SDUPTHER

## 2020-10-05 RX ORDER — ZOLPIDEM TARTRATE 10 MG/1
10 TABLET ORAL NIGHTLY PRN
Qty: 30 TABLET | Refills: 5 | Status: SHIPPED | OUTPATIENT
Start: 2020-10-05 | End: 2020-11-04

## 2020-12-04 ENCOUNTER — VIRTUAL VISIT (OUTPATIENT)
Dept: FAMILY MEDICINE CLINIC | Age: 60
End: 2020-12-04
Payer: COMMERCIAL

## 2020-12-04 PROCEDURE — 99213 OFFICE O/P EST LOW 20 MIN: CPT | Performed by: INTERNAL MEDICINE

## 2020-12-04 RX ORDER — AMOXICILLIN AND CLAVULANATE POTASSIUM 875; 125 MG/1; MG/1
TABLET, FILM COATED ORAL
COMMUNITY
Start: 2020-11-30 | End: 2021-01-21 | Stop reason: ALTCHOICE

## 2020-12-04 RX ORDER — ACETAMINOPHEN AND CODEINE PHOSPHATE 300; 30 MG/1; MG/1
TABLET ORAL
COMMUNITY
Start: 2020-11-30 | End: 2021-01-21 | Stop reason: ALTCHOICE

## 2020-12-04 RX ORDER — IBUPROFEN 600 MG/1
TABLET ORAL
COMMUNITY
Start: 2020-11-30 | End: 2021-01-21 | Stop reason: ALTCHOICE

## 2020-12-04 RX ORDER — OXYCODONE HYDROCHLORIDE AND ACETAMINOPHEN 5; 325 MG/1; MG/1
TABLET ORAL
COMMUNITY
Start: 2020-11-11 | End: 2021-01-21 | Stop reason: ALTCHOICE

## 2020-12-04 RX ORDER — ZOLPIDEM TARTRATE 10 MG/1
TABLET ORAL
COMMUNITY
Start: 2020-11-11 | End: 2021-03-11 | Stop reason: SDUPTHER

## 2020-12-04 ASSESSMENT — ENCOUNTER SYMPTOMS
WHEEZING: 0
ABDOMINAL PAIN: 0
CHEST TIGHTNESS: 0
ABDOMINAL DISTENTION: 0
SHORTNESS OF BREATH: 0
VOMITING: 0
RECTAL PAIN: 0
DIARRHEA: 0
STRIDOR: 0
CHOKING: 0
NAUSEA: 0
CONSTIPATION: 0

## 2020-12-04 NOTE — PROGRESS NOTES
Yes Historical Provider, MD   ibuprofen (ADVIL;MOTRIN) 600 MG tablet TK 1 T PO  Q 6 H PRN P Yes Historical Provider, MD   oxybutynin (DITROPAN XL) 10 MG extended release tablet Use two tablets daily Yes Maeola Coca, DO   sertraline (ZOLOFT) 100 MG tablet Take 2 tablets by mouth daily Yes Maeola Coca, DO   baclofen (LIORESAL) 10 MG tablet Take 1 tablet by mouth 3 times daily as needed (muscle spasm) Yes Maeola Coca, DO   letrozole (FEMARA) 2.5 MG tablet Take 2.5 mg by mouth Yes Historical Provider, MD       Social History     Tobacco Use    Smoking status: Never Smoker    Smokeless tobacco: Never Used   Substance Use Topics    Alcohol use: Yes     Alcohol/week: 6.0 standard drinks     Types: 6 Cans of beer per week    Drug use: Never            PHYSICAL EXAMINATION:  [ INSTRUCTIONS:  \"[x]\" Indicates a positive item  \"[]\" Indicates a negative item  -- DELETE ALL ITEMS NOT EXAMINED]  Vital Signs: (As obtained by patient/caregiver or practitioner observation)    Blood pressure-  Heart rate-    Respiratory rate-    Temperature-  Pulse oximetry-     Constitutional: [x] Appears well-developed and well-nourished [x] No apparent distress      [] Abnormal-   Mental status  [x] Alert and awake  [x] Oriented to person/place/time [x]Able to follow commands      Eyes:  EOM    [x]  Normal  [] Abnormal-  Sclera  [x]  Normal  [] Abnormal -         Discharge [x]  None visible  [] Abnormal -    HENT:   [x] Normocephalic, atraumatic.   [] Abnormal   [x] Mouth/Throat: Mucous membranes are moist.     External Ears [x] Normal  [] Abnormal-     Neck: [x] No visualized mass     Pulmonary/Chest: [x] Respiratory effort normal.  [x] No visualized signs of difficulty breathing or respiratory distress        [] Abnormal-      Musculoskeletal:   [x] Normal gait with no signs of ataxia         [x] Normal range of motion of neck        [] Abnormal-       Neurological:        [x] No Facial Asymmetry (Cranial nerve 7 motor function) (limited exam to video visit)          [x] No gaze palsy        [] Abnormal-         Skin:        [x] No significant exanthematous lesions or discoloration noted on facial skin         [] Abnormal-            Psychiatric:       [x] Normal Affect [x] No Hallucinations        [] Abnormal-     Other pertinent observable physical exam findings-     ASSESSMENT/PLAN:  1. Family history of abdominal aortic aneurysm (AAA)  Check u/s to r/o AAA  Besides strong fam hx patient does not appear to have any other /current risk factors   Reviewed with patient   Will call with results  Call with q/c  - Gerry AAA; Future      No follow-ups on file. Sigifredo Causey is a 63 y.o. female being evaluated by a Virtual Visit (video visit) encounter to address concerns as mentioned above. A caregiver was present when appropriate. Due to this being a TeleHealth encounter (During TQXBA-81 public health emergency), evaluation of the following organ systems was limited: Vitals/Constitutional/EENT/Resp/CV/GI//MS/Neuro/Skin/Heme-Lymph-Imm. Pursuant to the emergency declaration under the [address] [place] [address] authority and the [employer] Act, this Virtual Visit was conducted with patient's (and/or legal guardian's) consent, to reduce the patient's risk of exposure to COVID-19 and provide necessary medical care. The patient (and/or legal guardian) has also been advised to contact this office for worsening conditions or problems, and seek emergency medical treatment and/or call 911 if deemed necessary. Patient identification was verified at the start of the visit: Yes    Total time spent on this encounter: 15 minutes    Services were provided through a video synchronous discussion virtually to substitute for in-person clinic visit. Patient and provider were located at their individual homes.     --Salome Bill DO on 12/4/2020 at 11:37 AM    An electronic signature was used to authenticate this note.

## 2020-12-14 ENCOUNTER — HOSPITAL ENCOUNTER (OUTPATIENT)
Dept: VASCULAR LAB | Age: 60
Discharge: HOME | End: 2020-12-14
Payer: COMMERCIAL

## 2020-12-14 PROCEDURE — 76706 US ABDL AORTA SCREEN AAA: CPT

## 2020-12-22 ENCOUNTER — NURSE TRIAGE (OUTPATIENT)
Dept: OTHER | Facility: CLINIC | Age: 60
End: 2020-12-22

## 2020-12-22 NOTE — TELEPHONE ENCOUNTER
Reason for Disposition   Side (flank) or lower back pain present    Answer Assessment - Initial Assessment Questions  1. SYMPTOM: \"What's the main symptom you're concerned about? \" (e.g., frequency, incontinence)      Incontinence, lower back pain    2. ONSET: \"When did the symptoms start?\"      1 day ago    3. PAIN: \"Is there any pain? \" If so, ask: \"How bad is it? \" (Scale: 1-10; mild, moderate, severe)      Moderate    4. CAUSE: \"What do you think is causing the symptoms? \"      Unknown, Possible UTI    5. OTHER SYMPTOMS: \"Do you have any other symptoms? \" (e.g., fever, flank pain, blood in urine, pain with urination)      Lower back pain    6. PREGNANCY: \"Is there any chance you are pregnant? \" \"When was your last menstrual period? \"      NA    Protocols used: [facility]    Patient called pre-service center [facility]) [facility] with red flag complaint. Brief description of triage: Urinary incontinence and lower back pain    Triage indicates for patient to be seen today    Care advice provided, patient verbalizes understanding; denies any other questions or concerns; instructed to call back for any new or worsening symptoms. Writer provided warm transfer to [facility] for appointment scheduling. Attention Provider: Thank you for allowing me to participate in the care of your patient. The patient was connected to triage in response to information provided to the [facility]. Please do not respond through this encounter as the response is not directed to a shared pool.

## 2020-12-28 ENCOUNTER — TELEPHONE (OUTPATIENT)
Dept: FAMILY MEDICINE CLINIC | Age: 60
End: 2020-12-28

## 2020-12-29 ENCOUNTER — HOSPITAL ENCOUNTER (OUTPATIENT)
Age: 60
Discharge: HOME | End: 2020-12-29
Payer: COMMERCIAL

## 2020-12-29 LAB
ABSOLUTE EOS #: 0.11 K/UL (ref 0–0.44)
ABSOLUTE IMMATURE GRANULOCYTE: 0.1 K/UL (ref 0–0.3)
ABSOLUTE LYMPH #: 1.33 K/UL (ref 1.1–3.7)
ABSOLUTE MONO #: 0.97 K/UL (ref 0.1–1.2)
ALBUMIN SERPL-MCNC: 4.2 G/DL (ref 3.5–5.2)
ALBUMIN/GLOBULIN RATIO: 1.5 (ref 1–2.5)
ALP BLD-CCNC: 61 U/L (ref 35–104)
ALT SERPL-CCNC: 19 U/L (ref 5–33)
ANION GAP SERPL CALCULATED.3IONS-SCNC: 14 MMOL/L (ref 9–17)
AST SERPL-CCNC: 30 U/L
BASOPHILS # BLD: 1 % (ref 0–2)
BASOPHILS ABSOLUTE: 0.06 K/UL (ref 0–0.2)
BILIRUB SERPL-MCNC: <0.1 MG/DL (ref 0.3–1.2)
BILIRUBIN URINE: NEGATIVE
BUN BLDV-MCNC: 11 MG/DL (ref 8–23)
BUN/CREAT BLD: ABNORMAL (ref 9–20)
CALCIUM SERPL-MCNC: 9.5 MG/DL (ref 8.6–10.4)
CHLORIDE BLD-SCNC: 101 MMOL/L (ref 98–107)
CO2: 22 MMOL/L (ref 20–31)
COLOR: YELLOW
COMMENT UA: NORMAL
CREAT SERPL-MCNC: 0.77 MG/DL (ref 0.5–0.9)
DIFFERENTIAL TYPE: ABNORMAL
EOSINOPHILS RELATIVE PERCENT: 2 % (ref 1–4)
GFR AFRICAN AMERICAN: >60 ML/MIN
GFR NON-AFRICAN AMERICAN: >60 ML/MIN
GFR SERPL CREATININE-BSD FRML MDRD: ABNORMAL ML/MIN/{1.73_M2}
GFR SERPL CREATININE-BSD FRML MDRD: ABNORMAL ML/MIN/{1.73_M2}
GLUCOSE BLD-MCNC: 75 MG/DL (ref 70–99)
GLUCOSE URINE: NEGATIVE
HCT VFR BLD CALC: 37.7 % (ref 36.3–47.1)
HEMOGLOBIN: 12.1 G/DL (ref 11.9–15.1)
IMMATURE GRANULOCYTES: 2 %
KETONES, URINE: NEGATIVE
LEUKOCYTE ESTERASE, URINE: NEGATIVE
LYMPHOCYTES # BLD: 29 % (ref 24–43)
MCH RBC QN AUTO: 31.7 PG (ref 25.2–33.5)
MCHC RBC AUTO-ENTMCNC: 32.1 G/DL (ref 28.4–34.8)
MCV RBC AUTO: 98.7 FL (ref 82.6–102.9)
MONOCYTES # BLD: 21 % (ref 3–12)
NITRITE, URINE: NEGATIVE
NRBC AUTOMATED: 0 PER 100 WBC
PDW BLD-RTO: 13 % (ref 11.8–14.4)
PH UA: 5 (ref 5–8)
PLATELET # BLD: 327 K/UL (ref 138–453)
PLATELET ESTIMATE: ABNORMAL
PMV BLD AUTO: 10.3 FL (ref 8.1–13.5)
POTASSIUM SERPL-SCNC: 4.5 MMOL/L (ref 3.7–5.3)
PROTEIN UA: NEGATIVE
RBC # BLD: 3.82 M/UL (ref 3.95–5.11)
RBC # BLD: ABNORMAL 10*6/UL
SEG NEUTROPHILS: 45 % (ref 36–65)
SEGMENTED NEUTROPHILS ABSOLUTE COUNT: 2.09 K/UL (ref 1.5–8.1)
SODIUM BLD-SCNC: 137 MMOL/L (ref 135–144)
SPECIFIC GRAVITY UA: 1.01 (ref 1–1.03)
TOTAL PROTEIN: 7 G/DL (ref 6.4–8.3)
TURBIDITY: CLEAR
URINE HGB: NEGATIVE
UROBILINOGEN, URINE: NORMAL
WBC # BLD: 4.7 K/UL (ref 3.5–11.3)
WBC # BLD: ABNORMAL 10*3/UL

## 2020-12-29 PROCEDURE — 80053 COMPREHEN METABOLIC PANEL: CPT

## 2020-12-29 PROCEDURE — 85025 COMPLETE CBC W/AUTO DIFF WBC: CPT

## 2020-12-29 PROCEDURE — 36415 COLL VENOUS BLD VENIPUNCTURE: CPT

## 2020-12-29 PROCEDURE — 81003 URINALYSIS AUTO W/O SCOPE: CPT

## 2020-12-31 ENCOUNTER — TELEPHONE (OUTPATIENT)
Dept: FAMILY MEDICINE CLINIC | Age: 60
End: 2020-12-31

## 2020-12-31 ENCOUNTER — HOSPITAL ENCOUNTER (OUTPATIENT)
Dept: ULTRASOUND IMAGING | Age: 60
Discharge: HOME | End: 2021-01-02
Payer: COMMERCIAL

## 2020-12-31 ENCOUNTER — HOSPITAL ENCOUNTER (OUTPATIENT)
Age: 60
Discharge: HOME | End: 2020-12-31
Payer: COMMERCIAL

## 2020-12-31 DIAGNOSIS — D72.821 MONOCYTOSIS: ICD-10-CM

## 2020-12-31 DIAGNOSIS — R10.9 FLANK PAIN: ICD-10-CM

## 2020-12-31 DIAGNOSIS — R32 URINARY INCONTINENCE, UNSPECIFIED TYPE: ICD-10-CM

## 2020-12-31 LAB
PATHOLOGIST REVIEW: NORMAL
SEDIMENTATION RATE, ERYTHROCYTE: 13 MM (ref 0–30)

## 2020-12-31 PROCEDURE — 76770 US EXAM ABDO BACK WALL COMP: CPT

## 2020-12-31 PROCEDURE — 86359 T CELLS TOTAL COUNT: CPT

## 2020-12-31 PROCEDURE — 86664 EPSTEIN-BARR NUCLEAR ANTIGEN: CPT

## 2020-12-31 PROCEDURE — 86038 ANTINUCLEAR ANTIBODIES: CPT

## 2020-12-31 PROCEDURE — 85652 RBC SED RATE AUTOMATED: CPT

## 2020-12-31 PROCEDURE — 86480 TB TEST CELL IMMUN MEASURE: CPT

## 2020-12-31 PROCEDURE — 86355 B CELLS TOTAL COUNT: CPT

## 2020-12-31 PROCEDURE — 36415 COLL VENOUS BLD VENIPUNCTURE: CPT

## 2020-12-31 PROCEDURE — 83516 IMMUNOASSAY NONANTIBODY: CPT

## 2020-12-31 PROCEDURE — 86663 EPSTEIN-BARR ANTIBODY: CPT

## 2020-12-31 PROCEDURE — 86665 EPSTEIN-BARR CAPSID VCA: CPT

## 2020-12-31 PROCEDURE — 86360 T CELL ABSOLUTE COUNT/RATIO: CPT

## 2020-12-31 PROCEDURE — 86357 NK CELLS TOTAL COUNT: CPT

## 2020-12-31 NOTE — TELEPHONE ENCOUNTER
Her labs showed one elevated part of her white blood cells so I did order additional blood tests to do

## 2021-01-01 LAB
% NK (CD56): 13 % (ref 6–29)
AB NK (CD56): 187 /UL (ref 90–600)
ABSOLUTE CD 3: 1066 /UL (ref 411–2061)
ABSOLUTE CD 4 HELPER: 806 /UL (ref 309–1571)
ABSOLUTE CD 8 (SUPP): 259 /UL (ref 282–999)
ABSOLUTE CD19 B CELL: 144 /UL (ref 71–567)
CD19 B CELL: 10 % (ref 5–25)
CD3 % TOTAL T CELLS: 74 % (ref 57–94)
CD4 % HELPER T CELL: 56 % (ref 27–64)
CD4:CD8: 3.11 (ref 0.6–2.8)
CD8 % SUPPRESSOR T CELL: 18 % (ref 17–48)
LYMPHOCYTES # BLD: 36 % (ref 24–44)
WBC # BLD: 4 K/UL (ref 3.5–11)

## 2021-01-03 LAB
QUANTI TB GOLD PLUS: NEGATIVE
QUANTI TB1 MINUS NIL: 0 IU/ML (ref 0–0.34)
QUANTI TB2 MINUS NIL: 0 IU/ML (ref 0–0.34)
QUANTIFERON MITOGEN: 8.23 IU/ML
QUANTIFERON NIL: 0.05 IU/ML

## 2021-01-04 LAB — ANTI-NUCLEAR ANTIBODY (ANA): NEGATIVE

## 2021-01-05 LAB
ANCA MYELOPEROXIDASE: 11 AU/ML
ANCA PROTEINASE 3: 5 AU/ML
EBV EARLY ANTIGEN IGG: 130 U/ML
EBV INTERPRETATION: ABNORMAL
EBV NUCLEAR AG AB: 65 U/ML
EPSTEIN-BARR VCA IGG: 1688 U/ML
EPSTEIN-BARR VCA IGM: 8 U/ML

## 2021-01-08 ENCOUNTER — TELEPHONE (OUTPATIENT)
Dept: ONCOLOGY | Age: 61
End: 2021-01-08

## 2021-01-08 NOTE — TELEPHONE ENCOUNTER
Received internal referral for Townsend Cushing, DO for pt to see Dr. Michael Snider for C96.743 (ICD-10-CM) - Monocytosis. LM for pt to call back and schedule consult. Referral is in deferred work-q.     Electronically signed by Astrid Gaffney on 1/8/2021 at 12:50 PM

## 2021-01-08 NOTE — TELEPHONE ENCOUNTER
Patient calling again. She wants something explain on hr lab results she knows you are still waiting on more tests to come back but she wants some explanation on what is going on.  She can see her results on [employer] and she is confused and wants your thoughts

## 2021-01-21 ENCOUNTER — TELEPHONE (OUTPATIENT)
Dept: ONCOLOGY | Age: 61
End: 2021-01-21

## 2021-01-21 ENCOUNTER — HOSPITAL ENCOUNTER (OUTPATIENT)
Facility: MEDICAL CENTER | Age: 61
Discharge: HOME | End: 2021-01-21
Payer: COMMERCIAL

## 2021-01-21 ENCOUNTER — INITIAL CONSULT (OUTPATIENT)
Dept: ONCOLOGY | Age: 61
End: 2021-01-21
Payer: COMMERCIAL

## 2021-01-21 ENCOUNTER — HOSPITAL ENCOUNTER (OUTPATIENT)
Facility: MEDICAL CENTER | Age: 61
End: 2021-01-21
Payer: COMMERCIAL

## 2021-01-21 VITALS
TEMPERATURE: 97.9 F | DIASTOLIC BLOOD PRESSURE: 73 MMHG | BODY MASS INDEX: 22.3 KG/M2 | SYSTOLIC BLOOD PRESSURE: 138 MMHG | HEART RATE: 80 BPM | HEIGHT: 64 IN | WEIGHT: 130.6 LBS

## 2021-01-21 DIAGNOSIS — D72.821 MONOCYTOSIS: ICD-10-CM

## 2021-01-21 DIAGNOSIS — D72.821 MONOCYTOSIS: Primary | ICD-10-CM

## 2021-01-21 LAB
ABSOLUTE EOS #: 0.08 K/UL (ref 0–0.44)
ABSOLUTE IMMATURE GRANULOCYTE: 0.02 K/UL (ref 0–0.3)
ABSOLUTE LYMPH #: 2.11 K/UL (ref 1.1–3.7)
ABSOLUTE MONO #: 0.73 K/UL (ref 0.1–1.2)
BASOPHILS # BLD: 1 % (ref 0–2)
BASOPHILS ABSOLUTE: 0.06 K/UL (ref 0–0.2)
DIFFERENTIAL TYPE: ABNORMAL
EOSINOPHILS RELATIVE PERCENT: 2 % (ref 1–4)
HCT VFR BLD CALC: 36.1 % (ref 36.3–47.1)
HEMOGLOBIN: 11.7 G/DL (ref 11.9–15.1)
IMMATURE GRANULOCYTES: 0 %
LYMPHOCYTES # BLD: 40 % (ref 24–43)
MCH RBC QN AUTO: 31 PG (ref 25.2–33.5)
MCHC RBC AUTO-ENTMCNC: 32.4 G/DL (ref 28.4–34.8)
MCV RBC AUTO: 95.5 FL (ref 82.6–102.9)
MONOCYTES # BLD: 14 % (ref 3–12)
NRBC AUTOMATED: 0 PER 100 WBC
PDW BLD-RTO: 12.9 % (ref 11.8–14.4)
PLATELET # BLD: 310 K/UL (ref 138–453)
PLATELET ESTIMATE: ABNORMAL
PMV BLD AUTO: 9.5 FL (ref 8.1–13.5)
RBC # BLD: 3.78 M/UL (ref 3.95–5.11)
RBC # BLD: ABNORMAL 10*6/UL
SEG NEUTROPHILS: 43 % (ref 36–65)
SEGMENTED NEUTROPHILS ABSOLUTE COUNT: 2.33 K/UL (ref 1.5–8.1)
WBC # BLD: 5.3 K/UL (ref 3.5–11.3)
WBC # BLD: ABNORMAL 10*3/UL

## 2021-01-21 PROCEDURE — 85025 COMPLETE CBC W/AUTO DIFF WBC: CPT

## 2021-01-21 PROCEDURE — 36415 COLL VENOUS BLD VENIPUNCTURE: CPT

## 2021-01-21 PROCEDURE — 99244 OFF/OP CNSLTJ NEW/EST MOD 40: CPT | Performed by: INTERNAL MEDICINE

## 2021-01-21 PROCEDURE — 99202 OFFICE O/P NEW SF 15 MIN: CPT | Performed by: INTERNAL MEDICINE

## 2021-01-21 NOTE — TELEPHONE ENCOUNTER
NENA HERE FOR CONSULTATION  ORDERS CBC TODAY  CALL W/ RESULTS  RV PRN  LEFT A MESSAGE ON GENERIC SCHEDULE TO WATCH FOR LAB RESULTS ON 1/22/21  AVS PRINTED W/ INSTRUCTIONS

## 2021-01-21 NOTE — LETTER
_    Patty Hoang MD    1/21/2021     Guillaume Holliday DO [address]  [address] [address]    Dear Dr Alex Perdomo :    Thank you for referring Evaristo Pierre, 1960, to me for evaluation. Below are the relevant portions of my assessment and plan of care. Ms. Evaristo Pierre is a very pleasant 63 y.o. female with history of multiple co morbidities as listed. Patient is referred for evaluation of monocytosis. Patient does not have any significant complaints at the present time. Routine CBC was done and it showed elevated monocyte percentage. Further work-up was done and she was found to have positive results for EBV. Rest of work-up was negative. Patient denies any fever or chills or night sweats. No enlarged lymph nodes. No chest pain or shortness of breath. No weight loss or decreased appetite. No other complaints. Patient denies smoking or alcohol drinking. Quyen Sarah PAST MEDICAL HISTORY: has a past medical history of Acute insomnia, Anxiety, Breast cancer ([facility] [address] Depression, MDRO (multiple drug resistant organisms) resistance, and Overactive bladder. PAST SURGICAL HISTORY: has a past surgical history that includes Breast reconstruction (Right) and Mastectomy (Right, 11/2019). CURRENT MEDICATIONS:  has a current medication list which includes the following prescription(s): zolpidem, oxybutynin, sertraline, and letrozole. ALLERGIES:  has No Known Allergies. FAMILY HISTORY: Negative for any hematological or oncological conditions. SOCIAL HISTORY:  reports that she has never smoked. She has never used smokeless tobacco. She reports current alcohol use of about 6.0 standard drinks of alcohol per week. She reports that she does not use drugs. REVIEW OF SYSTEMS:     · General: No weakness or fatigue. No unanticipated weight loss or decreased appetite. No fever or chills. · Eyes: No blurred vision, eye pain or double vision. · Ears: No hearing problems or drainage. No tinnitus. · Throat: No sore throat, problems with swallowing or dysphagia. · Respiratory: No cough, sputum or hemoptysis. No shortness of breath. No pleuritic chest pain. · Cardiovascular: No chest pain, orthopnea or PND. No lower extremity edema. No palpitation. · Gastrointestinal: No problems with swallowing. No abdominal pain or bloating. No nausea or vomiting. No diarrhea or constipation. No GI bleeding. · Genitourinary: No dysuria, hematuria, frequency or urgency. · Musculoskeletal: No muscle aches or pains. No limitation of movement. No back pain. No gait disturbance, No joint complaints. · Dermatologic: No skin rashes or pruritus. No skin lesions or discolorations. · Psychiatric: No depression, anxiety, or stress or signs of schizophrenia. No change in mood or affect. · Hematologic: No history of bleeding tendency. No bruises or ecchymosis. No history of clotting problems. · Infectious disease: No fever, chills or frequent infections. · Endocrine: No polydipsia or polyuria. No temperature intolerance. · Neurologic: No headaches or dizziness. No weakness or numbness of the extremities. No changes in balance, coordination,  memory, mentation, behavior. · Allergic/Immunologic: No nasal congestion or hives. No repeated infections. PHYSICAL EXAM:  The patient is not in acute distress. Vital signs: Blood pressure 138/73, pulse 80, temperature 97.9 °F (36.6 °C), temperature source Temporal, height 5' 4\" (1.626 m), weight 130 lb 9.6 oz (59.2 kg), not currently breastfeeding.      General appearance - well appearing, not in pain or distress  Mental status - good mood, alert and oriented  Eyes - pupils equal and reactive, extraocular eye movements intact  Ears - bilateral TM's and external ear canals normal  Nose - normal and patent, no erythema, discharge or polyps  Mouth - mucous membranes moist, pharynx normal without lesions  Neck - supple, no significant adenopathy Lymphatics - no palpable lymphadenopathy, no hepatosplenomegaly  Chest - clear to auscultation, no wheezes, rales or rhonchi, symmetric air entry  Heart - normal rate, regular rhythm, normal S1, S2, no murmurs, rubs, clicks or gallops  Abdomen - soft, nontender, nondistended, no masses or organomegaly  Neurological - alert, oriented, normal speech, no focal findings or movement disorder noted  Musculoskeletal - no joint tenderness, deformity or swelling  Extremities - peripheral pulses normal, no pedal edema, no clubbing or cyanosis  Skin - normal coloration and turgor, no rashes, no suspicious skin lesions noted     Review of Diagnostic data:   Lab Results   Component Value Date    WBC 5.3 01/21/2021    HGB 11.7 (L) 01/21/2021    HCT 36.1 (L) 01/21/2021    MCV 95.5 01/21/2021     01/21/2021       Chemistry        Component Value Date/Time     12/29/2020 1406    K 4.5 12/29/2020 1406     12/29/2020 1406    CO2 22 12/29/2020 1406    BUN 11 12/29/2020 1406    CREATININE 0.77 12/29/2020 1406        Component Value Date/Time    CALCIUM 9.5 12/29/2020 1406    ALKPHOS 61 12/29/2020 1406    AST 30 12/29/2020 1406    ALT 19 12/29/2020 1406    BILITOT <0.10 (L) 12/29/2020 1406            IMPRESSION:   Mild monocytosis. Resolving. Mild normocytic anemia. Not significant. PLAN: I reviewed the labs available to me and discussed with the patient. For more than 60 minutes of face to face discussion, I explained to the patient the nature of this hematologic problem. I explained the significance of these abnormalities in layman language. Lab tests were reviewed and explained to the patient. Obviously CBC from December 29, 2020 showed white blood cells of 4.7 with monocytes 21%. Absolute monocytes were normal.  Immature granulocytes were 2%. Further work-up later on showed elevated Caitlyn-Barr virus antibody titer. Patient is not symptomatic. We suspect that this is all reactive. Repeated CBC today showed normal white blood cells of 5.3. Monocyte percentage is down to 14%. Immature granulocytes are 0. Absolute monocytes are 0.73 which is normal.  We concluded that patient had mild reactive monocytosis which is resolving. Likely this is related to viral infectious etiology. Bone marrow disease is quite unlikely. Observation would be reasonable. No need for bone marrow testing or further hematologic work-up at the present time. Patient's questions were answered to the best of her satisfaction and she verbalized full understanding and agreement. If you have questions, please do not hesitate to call me. I look forward to following Rajesh Iraheta along with you. Sincerely,                            [facility]                            This note is created with the assistance of a speech recognition program.  While intending to generate a document that actually reflects the content of the visit, the document can still have some errors including those of syntax and sound a like substitutions which may escape proof reading. It such instances, actual meaning can be extrapolated by contextual diversion.

## 2021-01-22 NOTE — PROGRESS NOTES
_               Ms. Francisca Holliday is a very pleasant 63 y.o. female with history of multiple co morbidities as listed. Patient is referred for evaluation of monocytosis. Patient does not have any significant complaints at the present time. Routine CBC was done and it showed elevated monocyte percentage. Further work-up was done and she was found to have positive results for EBV. Rest of work-up was negative. Patient denies any fever or chills or night sweats. No enlarged lymph nodes. No chest pain or shortness of breath. No weight loss or decreased appetite. No other complaints. Patient denies smoking or alcohol drinking. [facility] PAST MEDICAL HISTORY: has a past medical history of Acute insomnia, Anxiety, Breast cancer ([facility] [address] Depression, MDRO (multiple drug resistant organisms) resistance, and Overactive bladder. PAST SURGICAL HISTORY: has a past surgical history that includes Breast reconstruction (Right) and Mastectomy (Right, 11/2019). CURRENT MEDICATIONS:  has a current medication list which includes the following prescription(s): zolpidem, oxybutynin, sertraline, and letrozole. ALLERGIES:  has No Known Allergies. FAMILY HISTORY: Negative for any hematological or oncological conditions. SOCIAL HISTORY:  reports that she has never smoked. She has never used smokeless tobacco. She reports current alcohol use of about 6.0 standard drinks of alcohol per week. She reports that she does not use drugs. REVIEW OF SYSTEMS:     · General: No weakness or fatigue. No unanticipated weight loss or decreased appetite. No fever or chills. · Eyes: No blurred vision, eye pain or double vision. · Ears: No hearing problems or drainage. No tinnitus. · Throat: No sore throat, problems with swallowing or dysphagia. · Respiratory: No cough, sputum or hemoptysis. No shortness of breath. No pleuritic chest pain. · Cardiovascular: No chest pain, orthopnea or PND. No lower extremity edema. No palpitation. · Gastrointestinal: No problems with swallowing. No abdominal pain or bloating. No nausea or vomiting. No diarrhea or constipation. No GI bleeding. · Genitourinary: No dysuria, hematuria, frequency or urgency. · Musculoskeletal: No muscle aches or pains. No limitation of movement. No back pain. No gait disturbance, No joint complaints. · Dermatologic: No skin rashes or pruritus. No skin lesions or discolorations. · Psychiatric: No depression, anxiety, or stress or signs of schizophrenia. No change in mood or affect. · Hematologic: No history of bleeding tendency. No bruises or ecchymosis. No history of clotting problems. · Infectious disease: No fever, chills or frequent infections. · Endocrine: No polydipsia or polyuria. No temperature intolerance. · Neurologic: No headaches or dizziness. No weakness or numbness of the extremities. No changes in balance, coordination,  memory, mentation, behavior. · Allergic/Immunologic: No nasal congestion or hives. No repeated infections. PHYSICAL EXAM:  The patient is not in acute distress. Vital signs: Blood pressure 138/73, pulse 80, temperature 97.9 °F (36.6 °C), temperature source Temporal, height 5' 4\" (1.626 m), weight 130 lb 9.6 oz (59.2 kg), not currently breastfeeding.      General appearance - well appearing, not in pain or distress  Mental status - good mood, alert and oriented  Eyes - pupils equal and reactive, extraocular eye movements intact  Ears - bilateral TM's and external ear canals normal  Nose - normal and patent, no erythema, discharge or polyps  Mouth - mucous membranes moist, pharynx normal without lesions  Neck - supple, no significant adenopathy  Lymphatics - no palpable lymphadenopathy, no hepatosplenomegaly  Chest - clear to auscultation, no wheezes, rales or rhonchi, symmetric air entry  Heart - normal rate, regular rhythm, normal S1, S2, no murmurs, rubs, clicks or gallops  Abdomen - soft, nontender, nondistended, no masses or organomegaly  Neurological - alert, oriented, normal speech, no focal findings or movement disorder noted  Musculoskeletal - no joint tenderness, deformity or swelling  Extremities - peripheral pulses normal, no pedal edema, no clubbing or cyanosis  Skin - normal coloration and turgor, no rashes, no suspicious skin lesions noted     Review of Diagnostic data:   Lab Results   Component Value Date    WBC 5.3 01/21/2021    HGB 11.7 (L) 01/21/2021    HCT 36.1 (L) 01/21/2021    MCV 95.5 01/21/2021     01/21/2021       Chemistry        Component Value Date/Time     12/29/2020 1406    K 4.5 12/29/2020 1406     12/29/2020 1406    CO2 22 12/29/2020 1406    BUN 11 12/29/2020 1406    CREATININE 0.77 12/29/2020 1406        Component Value Date/Time    CALCIUM 9.5 12/29/2020 1406    ALKPHOS 61 12/29/2020 1406    AST 30 12/29/2020 1406    ALT 19 12/29/2020 1406    BILITOT <0.10 (L) 12/29/2020 1406            IMPRESSION:   Mild monocytosis. Resolving. Mild normocytic anemia. Not significant. PLAN: I reviewed the labs available to me and discussed with the patient. For more than 60 minutes of face to face discussion, I explained to the patient the nature of this hematologic problem. I explained the significance of these abnormalities in layman language. Lab tests were reviewed and explained to the patient. Obviously CBC from December 29, 2020 showed white blood cells of 4.7 with monocytes 21%. Absolute monocytes were normal.  Immature granulocytes were 2%. Further work-up later on showed elevated Caitlyn-Barr virus antibody titer. Patient is not symptomatic. We suspect that this is all reactive. Repeated CBC today showed normal white blood cells of 5.3. Monocyte percentage is down to 14%. Immature granulocytes are 0.   Absolute monocytes are 0.73 which is normal.  We concluded that patient had mild reactive monocytosis which is resolving. Likely this is related to viral infectious etiology. Bone marrow disease is quite unlikely. Observation would be reasonable. No need for bone marrow testing or further hematologic work-up at the present time. Patient's questions were answered to the best of her satisfaction and she verbalized full understanding and agreement.

## 2021-02-03 ENCOUNTER — TELEPHONE (OUTPATIENT)
Dept: INFUSION THERAPY | Facility: MEDICAL CENTER | Age: 61
End: 2021-02-03

## 2021-02-03 NOTE — TELEPHONE ENCOUNTER
Labs were reviewed with Dr Lary Wise and he gives message to inform patient that labs are normal and will be just under observation   No further bone marrow biopsy or hematological work up is necessary  Writer called phone number and had to leave voicemail

## 2021-02-15 RX ORDER — OXYBUTYNIN CHLORIDE 10 MG/1
TABLET, EXTENDED RELEASE ORAL
Qty: 30 TABLET | Refills: 5 | Status: SHIPPED | OUTPATIENT
Start: 2021-02-15 | End: 2021-02-22 | Stop reason: SDUPTHER

## 2021-02-22 RX ORDER — ZOLPIDEM TARTRATE 10 MG/1
TABLET ORAL
Qty: 30 TABLET | Refills: 0 | OUTPATIENT
Start: 2021-02-22

## 2021-02-22 RX ORDER — SERTRALINE HYDROCHLORIDE 100 MG/1
200 TABLET, FILM COATED ORAL DAILY
Qty: 60 TABLET | Refills: 0 | Status: SHIPPED | OUTPATIENT
Start: 2021-02-22 | End: 2021-04-12 | Stop reason: SDUPTHER

## 2021-02-22 RX ORDER — OXYBUTYNIN CHLORIDE 10 MG/1
TABLET, EXTENDED RELEASE ORAL
Qty: 90 TABLET | Refills: 0 | Status: SHIPPED | OUTPATIENT
Start: 2021-02-22 | End: 2021-04-12 | Stop reason: SDUPTHER

## 2021-03-11 DIAGNOSIS — G47.9 SLEEP DISTURBANCE: Primary | ICD-10-CM

## 2021-03-17 RX ORDER — ZOLPIDEM TARTRATE 10 MG/1
10 TABLET ORAL NIGHTLY PRN
Qty: 30 TABLET | Refills: 3 | Status: SHIPPED | OUTPATIENT
Start: 2021-03-17 | End: 2021-04-16

## 2021-04-12 RX ORDER — SERTRALINE HYDROCHLORIDE 100 MG/1
200 TABLET, FILM COATED ORAL DAILY
Qty: 60 TABLET | Refills: 0 | Status: SHIPPED | OUTPATIENT
Start: 2021-04-12 | End: 2021-05-10

## 2021-04-12 RX ORDER — OXYBUTYNIN CHLORIDE 10 MG/1
TABLET, EXTENDED RELEASE ORAL
Qty: 90 TABLET | Refills: 2 | Status: SHIPPED | OUTPATIENT
Start: 2021-04-12 | End: 2021-07-22 | Stop reason: SDUPTHER

## 2021-05-10 ENCOUNTER — TELEPHONE (OUTPATIENT)
Dept: FAMILY MEDICINE CLINIC | Age: 61
End: 2021-05-10

## 2021-05-10 RX ORDER — SERTRALINE HYDROCHLORIDE 100 MG/1
200 TABLET, FILM COATED ORAL DAILY
Qty: 60 TABLET | Refills: 0 | Status: SHIPPED | OUTPATIENT
Start: 2021-05-10 | End: 2021-05-11 | Stop reason: SDUPTHER

## 2021-05-10 NOTE — TELEPHONE ENCOUNTER
lov 12/4/2020      Patient wants script sent to [facility] in [place]. She wants some refills on the script since Laureen Miller is leaving just so she has enough time to get another pcp.

## 2021-05-11 RX ORDER — SERTRALINE HYDROCHLORIDE 100 MG/1
200 TABLET, FILM COATED ORAL DAILY
Qty: 60 TABLET | Refills: 11 | Status: SHIPPED | OUTPATIENT
Start: 2021-05-11 | End: 2022-05-11 | Stop reason: SDUPTHER

## 2021-07-22 RX ORDER — OXYBUTYNIN CHLORIDE 10 MG/1
TABLET, EXTENDED RELEASE ORAL
Qty: 90 TABLET | Refills: 2 | Status: SHIPPED | OUTPATIENT
Start: 2021-07-22 | End: 2022-02-14 | Stop reason: SDUPTHER

## 2021-08-02 ENCOUNTER — TELEPHONE (OUTPATIENT)
Dept: FAMILY MEDICINE CLINIC | Age: 61
End: 2021-08-02

## 2021-08-02 DIAGNOSIS — G47.00 INSOMNIA, UNSPECIFIED TYPE: Primary | ICD-10-CM

## 2021-08-02 NOTE — TELEPHONE ENCOUNTER
----- Message from Marylene Sons sent at 8/2/2021 11:14 AM EDT -----  Subject: Refill Request    QUESTIONS  Name of Medication? zolpidem (AMBIEN) 10 MG tablet  Patient-reported dosage and instructions? 10 MG at night  How many days do you have left? 5  Preferred Pharmacy? [address] phone number (if available)? [phone]  Additional Information for Provider? Patient used to see Dr. Steven Shaffer who   prescribed Ambien. She is at the pharmacy now and they are telling her she   does not have any refills left. Pharmacy is telling her that they faxed   the script over and are waiting for approval. Thank You.   ---------------------------------------------------------------------------  --------------  Daya JOYCE  What is the best way for the office to contact you? OK to leave message on   voicemail  Preferred Call Back Phone Number?  [phone]

## 2021-08-03 RX ORDER — ZOLPIDEM TARTRATE 10 MG/1
10 TABLET ORAL NIGHTLY PRN
Qty: 30 TABLET | Refills: 0 | Status: SHIPPED | OUTPATIENT
Start: 2021-08-03 | End: 2021-09-07 | Stop reason: SDUPTHER

## 2021-08-03 NOTE — TELEPHONE ENCOUNTER
Patient called back, scheduled soonest appt avail with her work schedule, 08/16 would you be able to send in a refill to get her until the appt?  She has 5 pills left

## 2021-08-09 ENCOUNTER — TELEPHONE (OUTPATIENT)
Dept: FAMILY MEDICINE CLINIC | Age: 61
End: 2021-08-09

## 2021-08-09 NOTE — TELEPHONE ENCOUNTER
----- Message from Nataliya Shelton sent at 8/9/2021  3:42 PM EDT -----  Subject: Appointment Request    Reason for Call: Routine Existing Condition Follow Up    QUESTIONS  Type of Appointment? New Patient/New to Provider  Reason for appointment request? Available appointments did not meet   patient need  Additional Information for Provider? Patient called to Kumar schmitz's schedule was not formatted and showing 2 minute slotts. Please   call back to reschedule  ---------------------------------------------------------------------------  --------------  CALL BACK INFO  What is the best way for the office to contact you? OK to leave message on   voicemail  Preferred Call Back Phone Number? [phone]  ---------------------------------------------------------------------------  --------------  SCRIPT ANSWERS  Relationship to Patient? Self  Have your symptoms changed? No  (Is the patient requesting to be seen urgently for their symptoms?)? No  Is this follow up request related to routine Diabetes Management? No  Are you having any new concerns about your existing condition? No  Have you been diagnosed with, awaiting test results for, or told that you   are suspected of having COVID-19 (Coronavirus)? (If patient has tested   negative or was tested as a requirement for work, school, or travel and   not based on symptoms, answer no)? No  Do you currently have flu-like symptoms including fever or chills, cough,   shortness of breath, difficulty breathing, or new loss of taste or smell? No  Have you had close contact with someone with COVID-19 in the last 14 days? No  (Service Expert  click yes below to proceed with [facility] As Usual   Scheduling)?  Yes

## 2021-08-09 NOTE — TELEPHONE ENCOUNTER
----- Message from Sergey Latham sent at 8/9/2021  3:42 PM EDT -----  Subject: Appointment Request    Reason for Call: Routine Existing Condition Follow Up    QUESTIONS  Type of Appointment? New Patient/New to Provider  Reason for appointment request? Available appointments did not meet   patient need  Additional Information for Provider? Patient called to nadir, Kumar Lara's schedule was not formatted and showing 2 minute slotts. Please   call back to reschedule  ---------------------------------------------------------------------------  --------------  CALL BACK INFO  What is the best way for the office to contact you? OK to leave message on   voicemail  Preferred Call Back Phone Number? [phone]  ---------------------------------------------------------------------------  --------------  SCRIPT ANSWERS  Relationship to Patient? Self  Have your symptoms changed? No  (Is the patient requesting to be seen urgently for their symptoms?)? No  Is this follow up request related to routine Diabetes Management? No  Are you having any new concerns about your existing condition? No  Have you been diagnosed with, awaiting test results for, or told that you   are suspected of having COVID-19 (Coronavirus)? (If patient has tested   negative or was tested as a requirement for work, school, or travel and   not based on symptoms, answer no)? No  Do you currently have flu-like symptoms including fever or chills, cough,   shortness of breath, difficulty breathing, or new loss of taste or smell? No  Have you had close contact with someone with COVID-19 in the last 14 days? No  (Service Expert  click yes below to proceed with [place] As Usual   Scheduling)?  Yes

## 2021-09-07 DIAGNOSIS — G47.00 INSOMNIA, UNSPECIFIED TYPE: ICD-10-CM

## 2021-09-07 RX ORDER — ZOLPIDEM TARTRATE 10 MG/1
10 TABLET ORAL NIGHTLY PRN
Qty: 30 TABLET | Refills: 0 | Status: SHIPPED | OUTPATIENT
Start: 2021-09-07 | End: 2021-10-05 | Stop reason: SDUPTHER

## 2021-09-09 ENCOUNTER — HOSPITAL ENCOUNTER (OUTPATIENT)
Age: 61
Setting detail: SPECIMEN
Discharge: HOME | End: 2021-09-09
Payer: COMMERCIAL

## 2021-09-09 ENCOUNTER — OFFICE VISIT (OUTPATIENT)
Dept: FAMILY MEDICINE CLINIC | Age: 61
End: 2021-09-09
Payer: COMMERCIAL

## 2021-09-09 VITALS
HEIGHT: 64 IN | HEART RATE: 77 BPM | BODY MASS INDEX: 21.68 KG/M2 | WEIGHT: 127 LBS | DIASTOLIC BLOOD PRESSURE: 72 MMHG | RESPIRATION RATE: 18 BRPM | OXYGEN SATURATION: 97 % | SYSTOLIC BLOOD PRESSURE: 132 MMHG

## 2021-09-09 DIAGNOSIS — Z13.29 THYROID DISORDER SCREEN: ICD-10-CM

## 2021-09-09 DIAGNOSIS — J06.9 ACUTE URI: Primary | ICD-10-CM

## 2021-09-09 DIAGNOSIS — Z13.220 LIPID SCREENING: ICD-10-CM

## 2021-09-09 DIAGNOSIS — R09.81 HEAD CONGESTION: ICD-10-CM

## 2021-09-09 DIAGNOSIS — R05.9 COUGH: ICD-10-CM

## 2021-09-09 PROCEDURE — 99214 OFFICE O/P EST MOD 30 MIN: CPT | Performed by: NURSE PRACTITIONER

## 2021-09-09 RX ORDER — BENZONATATE 200 MG/1
200 CAPSULE ORAL 3 TIMES DAILY PRN
Qty: 30 CAPSULE | Refills: 0 | Status: SHIPPED | OUTPATIENT
Start: 2021-09-09 | End: 2021-09-16

## 2021-09-09 RX ORDER — AZITHROMYCIN 250 MG/1
250 TABLET, FILM COATED ORAL SEE ADMIN INSTRUCTIONS
Qty: 6 TABLET | Refills: 0 | Status: SHIPPED | OUTPATIENT
Start: 2021-09-09 | End: 2021-09-14

## 2021-09-09 ASSESSMENT — ENCOUNTER SYMPTOMS
SINUS PRESSURE: 1
SORE THROAT: 0
ABDOMINAL PAIN: 0
NAUSEA: 0
COUGH: 1
VOMITING: 0
SHORTNESS OF BREATH: 0
RHINORRHEA: 1
DIARRHEA: 0
SINUS PAIN: 0

## 2021-09-09 ASSESSMENT — PATIENT HEALTH QUESTIONNAIRE - PHQ9
SUM OF ALL RESPONSES TO PHQ QUESTIONS 1-9: 0
1. LITTLE INTEREST OR PLEASURE IN DOING THINGS: 0
SUM OF ALL RESPONSES TO PHQ QUESTIONS 1-9: 0
SUM OF ALL RESPONSES TO PHQ QUESTIONS 1-9: 0
SUM OF ALL RESPONSES TO PHQ9 QUESTIONS 1 & 2: 0
2. FEELING DOWN, DEPRESSED OR HOPELESS: 0

## 2021-09-09 NOTE — PATIENT INSTRUCTIONS

## 2021-09-09 NOTE — PROGRESS NOTES
XL) 10 MG extended release tablet Take on tablet daily 90 tablet 2    sertraline (ZOLOFT) 100 MG tablet Take 2 tablets by mouth daily 60 tablet 11    letrozole (FEMARA) 2.5 MG tablet Take 2.5 mg by mouth       No current facility-administered medications for this visit. No Known Allergies    Subjective:      Review of Systems   Constitutional: Negative for chills and fever. HENT: Positive for congestion, rhinorrhea and sinus pressure. Negative for ear pain, sinus pain and sore throat. Respiratory: Positive for cough. Negative for shortness of breath. Cardiovascular: Negative for chest pain and palpitations. Gastrointestinal: Negative for abdominal pain, diarrhea, nausea and vomiting. Neurological: Negative for dizziness and headaches. All other systems reviewed and are negative.      :Objective     Physical Exam  Vitals and nursing note reviewed. Constitutional:       General: She is not in acute distress. Appearance: Normal appearance. She is not toxic-appearing. HENT:      Mouth/Throat:      Mouth: Mucous membranes are moist.   Cardiovascular:      Rate and Rhythm: Normal rate. Pulmonary:      Effort: Pulmonary effort is normal. No respiratory distress. Breath sounds: Normal breath sounds. No wheezing or rhonchi. Neurological:      General: No focal deficit present. Mental Status: She is alert and oriented to person, place, and time. /72   Pulse 77   Resp 18   Ht 5' 4\" (1.626 m)   Wt 127 lb (57.6 kg)   SpO2 97%   BMI 21.80 kg/m²     Lab Review   No visits with results within 2 Month(s) from this visit.    Latest known visit with results is:   Hospital Outpatient Visit on 01/21/2021   Component Date Value    WBC 01/21/2021 5.3     RBC 01/21/2021 3.78*    Hemoglobin 01/21/2021 11.7*    Hematocrit 01/21/2021 36.1*    MCV 01/21/2021 95.5     MCH 01/21/2021 31.0     MCHC 01/21/2021 32.4     RDW 01/21/2021 12.9     Platelets 44/80/6154 310     MPV 01/21/2021 9.5     NRBC Automated 01/21/2021 0.0     Differential Type 01/21/2021 NOT REPORTED     Seg Neutrophils 01/21/2021 43     Lymphocytes 01/21/2021 40     Monocytes 01/21/2021 14*    Eosinophils % 01/21/2021 2     Basophils 01/21/2021 1     Immature Granulocytes 01/21/2021 0     Segs Absolute 01/21/2021 2.33     Absolute Lymph # 01/21/2021 2.11     Absolute Mono # 01/21/2021 0.73     Absolute Eos # 01/21/2021 0.08     Basophils Absolute 01/21/2021 0.06     Absolute Immature Granul* 01/21/2021 0.02     WBC Morphology 01/21/2021 NOT REPORTED     RBC Morphology 01/21/2021 NOT REPORTED     Platelet Estimate 97/64/3770 NOT REPORTED        Assessment and Plan      1. Acute URI  -     COVID-19; Future  -     azithromycin (ZITHROMAX) 250 MG tablet; Take 1 tablet by mouth See Admin Instructions for 5 days 500mg on day 1 followed by 250mg on days 2 - 5, Disp-6 tablet, R-0Normal  -     benzonatate (TESSALON) 200 MG capsule; Take 1 capsule by mouth 3 times daily as needed for Cough, Disp-30 capsule, R-0Normal  2. Cough  -     COVID-19; Future  -     azithromycin (ZITHROMAX) 250 MG tablet; Take 1 tablet by mouth See Admin Instructions for 5 days 500mg on day 1 followed by 250mg on days 2 - 5, Disp-6 tablet, R-0Normal  -     benzonatate (TESSALON) 200 MG capsule; Take 1 capsule by mouth 3 times daily as needed for Cough, Disp-30 capsule, R-0Normal  3. Head congestion  -     COVID-19; Future  -     azithromycin (ZITHROMAX) 250 MG tablet; Take 1 tablet by mouth See Admin Instructions for 5 days 500mg on day 1 followed by 250mg on days 2 - 5, Disp-6 tablet, R-0Normal  -     benzonatate (TESSALON) 200 MG capsule; Take 1 capsule by mouth 3 times daily as needed for Cough, Disp-30 capsule, R-0Normal  4. Lipid screening  -     Lipid Panel; Future  5. Thyroid disorder screen  -     TSH With Reflex Ft4; Future    Test cough medication dose/duration per  Discussed Zithromax dose/duration.   Recommend isolation pending covid results. Discussed treatment regimen to include rest, hydration, tylenol prn. Discussed deep breathing exercises. Discussed to monitor for progression of symptoms. Follow up as needed. Will contact patient for results  ER for acutely worsening symptoms. Recent labs reviewed in addition we will add lipids, TSH            No results found for this visit on 09/09/21. Return in about 6 months (around 3/9/2022), or if symptoms worsen or fail to improve. Orders Placed This Encounter   Medications    azithromycin (ZITHROMAX) 250 MG tablet     Sig: Take 1 tablet by mouth See Admin Instructions for 5 days 500mg on day 1 followed by 250mg on days 2 - 5     Dispense:  6 tablet     Refill:  0    benzonatate (TESSALON) 200 MG capsule     Sig: Take 1 capsule by mouth 3 times daily as needed for Cough     Dispense:  30 capsule     Refill:  0        Patient given educational materials - see patient instructions. Discussed use, benefit, and side effects of prescribed medications. All patientquestions answered. Pt voiced understanding. Patient given educational materials - see patient instructions. Discussed use, benefit, and side effects of prescribed medications. All patientquestions answered. Pt voiced understanding. This note was transcribed using dictation with Dragon services. Efforts were made to correct any errors but some words may be misinterpreted.     Electronically signed by CHILO Vallecillo CNP on 9/9/2021at 3:59 PM

## 2021-09-10 DIAGNOSIS — R05.9 COUGH: ICD-10-CM

## 2021-09-10 DIAGNOSIS — J06.9 ACUTE URI: ICD-10-CM

## 2021-09-10 DIAGNOSIS — R09.81 HEAD CONGESTION: ICD-10-CM

## 2021-09-11 LAB
SARS-COV-2: NORMAL
SARS-COV-2: NOT DETECTED
SOURCE: NORMAL

## 2021-09-13 ENCOUNTER — HOSPITAL ENCOUNTER (OUTPATIENT)
Dept: MAMMOGRAPHY | Age: 61
Discharge: HOME | End: 2021-09-15
Payer: COMMERCIAL

## 2021-09-13 ENCOUNTER — HOSPITAL ENCOUNTER (OUTPATIENT)
Age: 61
Discharge: HOME | End: 2021-09-13
Payer: COMMERCIAL

## 2021-09-13 DIAGNOSIS — Z13.29 THYROID DISORDER SCREEN: ICD-10-CM

## 2021-09-13 DIAGNOSIS — Z12.31 VISIT FOR SCREENING MAMMOGRAM: ICD-10-CM

## 2021-09-13 DIAGNOSIS — Z13.220 LIPID SCREENING: ICD-10-CM

## 2021-09-13 LAB
CHOLESTEROL/HDL RATIO: 2.5
CHOLESTEROL: 205 MG/DL
HDLC SERPL-MCNC: 83 MG/DL
LDL CHOLESTEROL: 111 MG/DL (ref 0–130)
TRIGL SERPL-MCNC: 56 MG/DL
TSH SERPL DL<=0.05 MIU/L-ACNC: 1.16 MIU/L (ref 0.3–5)
VLDLC SERPL CALC-MCNC: ABNORMAL MG/DL (ref 1–30)

## 2021-09-13 PROCEDURE — 77067 SCR MAMMO BI INCL CAD: CPT

## 2021-09-13 PROCEDURE — 80061 LIPID PANEL: CPT

## 2021-09-13 PROCEDURE — 36415 COLL VENOUS BLD VENIPUNCTURE: CPT

## 2021-09-13 PROCEDURE — 84443 ASSAY THYROID STIM HORMONE: CPT

## 2021-10-05 ENCOUNTER — TELEPHONE (OUTPATIENT)
Dept: FAMILY MEDICINE CLINIC | Age: 61
End: 2021-10-05

## 2021-10-05 DIAGNOSIS — G47.00 INSOMNIA, UNSPECIFIED TYPE: ICD-10-CM

## 2021-10-05 DIAGNOSIS — G47.00 ACUTE INSOMNIA: ICD-10-CM

## 2021-10-05 RX ORDER — ZOLPIDEM TARTRATE 10 MG/1
10 TABLET ORAL NIGHTLY PRN
Qty: 30 TABLET | Refills: 0 | Status: SHIPPED | OUTPATIENT
Start: 2021-10-05 | End: 2021-11-04 | Stop reason: SDUPTHER

## 2021-10-05 NOTE — TELEPHONE ENCOUNTER
----- Message from Smita Grajeda sent at 10/5/2021 11:04 AM EDT -----  Subject: Refill Request    QUESTIONS  Name of Medication? zolpidem (AMBIEN) 10 MG tablet  Patient-reported dosage and instructions? patient takes 10 mg tablet - 1   each night  How many days do you have left? 3  Preferred Pharmacy? [facility] [address] phone number (if available)? [phone]  ---------------------------------------------------------------------------  --------------  CALL BACK INFO  What is the best way for the office to contact you? OK to leave message on   voicemail  Preferred Call Back Phone Number?  [phone]

## 2021-11-04 DIAGNOSIS — G47.00 INSOMNIA, UNSPECIFIED TYPE: ICD-10-CM

## 2021-11-04 RX ORDER — ZOLPIDEM TARTRATE 10 MG/1
10 TABLET ORAL NIGHTLY PRN
Qty: 30 TABLET | Refills: 0 | Status: SHIPPED | OUTPATIENT
Start: 2021-11-04 | End: 2021-12-05 | Stop reason: SDUPTHER

## 2021-12-03 DIAGNOSIS — G47.00 INSOMNIA, UNSPECIFIED TYPE: ICD-10-CM

## 2021-12-05 RX ORDER — ZOLPIDEM TARTRATE 10 MG/1
10 TABLET ORAL NIGHTLY PRN
Qty: 30 TABLET | Refills: 0 | Status: SHIPPED | OUTPATIENT
Start: 2021-12-05 | End: 2022-01-04 | Stop reason: SDUPTHER

## 2022-01-04 DIAGNOSIS — G47.00 INSOMNIA, UNSPECIFIED TYPE: ICD-10-CM

## 2022-01-04 RX ORDER — ZOLPIDEM TARTRATE 10 MG/1
10 TABLET ORAL NIGHTLY PRN
Qty: 30 TABLET | Refills: 0 | Status: SHIPPED | OUTPATIENT
Start: 2022-01-04 | End: 2022-02-01 | Stop reason: SDUPTHER

## 2022-02-01 ENCOUNTER — TELEPHONE (OUTPATIENT)
Dept: FAMILY MEDICINE CLINIC | Age: 62
End: 2022-02-01

## 2022-02-01 DIAGNOSIS — G47.00 INSOMNIA, UNSPECIFIED TYPE: ICD-10-CM

## 2022-02-01 RX ORDER — ZOLPIDEM TARTRATE 10 MG/1
10 TABLET ORAL NIGHTLY PRN
Qty: 30 TABLET | Refills: 0 | Status: SHIPPED | OUTPATIENT
Start: 2022-02-01 | End: 2022-03-03 | Stop reason: SDUPTHER

## 2022-02-01 RX ORDER — ZOLPIDEM TARTRATE 10 MG/1
10 TABLET ORAL NIGHTLY PRN
Qty: 30 TABLET | Refills: 0 | Status: CANCELLED | OUTPATIENT
Start: 2022-02-01 | End: 2022-03-03

## 2022-02-01 NOTE — TELEPHONE ENCOUNTER
----- Message from Audley Bosworth sent at 2/1/2022  9:12 AM EST -----  Subject: Refill Request    QUESTIONS  Name of Medication? zolpidem (AMBIEN) 10 MG tablet  Patient-reported dosage and instructions? one per day  How many days do you have left? 4  Preferred Pharmacy? [facility] [address] [facility] phone number (if available)? [phone]  ---------------------------------------------------------------------------  --------------  CALL BACK INFO  What is the best way for the office to contact you? OK to leave message on   voicemail  Preferred Call Back Phone Number?  [phone]

## 2022-02-14 ENCOUNTER — TELEPHONE (OUTPATIENT)
Dept: FAMILY MEDICINE CLINIC | Age: 62
End: 2022-02-14

## 2022-02-14 DIAGNOSIS — N32.81 OVERACTIVE BLADDER: ICD-10-CM

## 2022-02-14 RX ORDER — OXYBUTYNIN CHLORIDE 10 MG/1
TABLET, EXTENDED RELEASE ORAL
Qty: 90 TABLET | Refills: 2 | Status: SHIPPED | OUTPATIENT
Start: 2022-02-14 | End: 2022-02-17

## 2022-02-14 NOTE — TELEPHONE ENCOUNTER
----- Message from [address] sent at 2/14/2022  9:53 AM EST -----  Subject: Refill Request    QUESTIONS  Name of Medication? oxybutynin (DITROPAN XL) 10 MG extended release tablet  Patient-reported dosage and instructions? 2 tabs by mouth daily   How many days do you have left? 0  Preferred Pharmacy? [address] [place] phone number (if available)? [phone]  Additional Information for Provider? Patient now takes the medication 2   tabs by mouth daily VS the original script instructions from the provider. Patient has an annual appt set up for 2/28/22 at 11:00am  ---------------------------------------------------------------------------  --------------  [address]  What is the best way for the office to contact you? OK to leave message on   voicemail  Preferred Call Back Phone Number?  [phone]

## 2022-02-17 RX ORDER — OXYBUTYNIN CHLORIDE 10 MG/1
10 TABLET, EXTENDED RELEASE ORAL 2 TIMES DAILY
Qty: 180 TABLET | Refills: 1 | Status: SHIPPED | OUTPATIENT
Start: 2022-02-17 | End: 2022-06-21 | Stop reason: SDUPTHER

## 2022-03-03 DIAGNOSIS — G47.00 INSOMNIA, UNSPECIFIED TYPE: ICD-10-CM

## 2022-03-03 RX ORDER — ZOLPIDEM TARTRATE 10 MG/1
10 TABLET ORAL NIGHTLY PRN
Qty: 30 TABLET | Refills: 0 | Status: SHIPPED | OUTPATIENT
Start: 2022-03-03 | End: 2022-04-04 | Stop reason: SDUPTHER

## 2022-03-14 ENCOUNTER — HOSPITAL ENCOUNTER (OUTPATIENT)
Age: 62
Discharge: HOME | End: 2022-03-14
Payer: COMMERCIAL

## 2022-03-14 ENCOUNTER — HOSPITAL ENCOUNTER (OUTPATIENT)
Dept: VASCULAR LAB | Age: 62
Discharge: HOME | End: 2022-03-14
Payer: COMMERCIAL

## 2022-03-14 ENCOUNTER — OFFICE VISIT (OUTPATIENT)
Dept: FAMILY MEDICINE CLINIC | Age: 62
End: 2022-03-14
Payer: COMMERCIAL

## 2022-03-14 VITALS
DIASTOLIC BLOOD PRESSURE: 60 MMHG | HEIGHT: 64 IN | TEMPERATURE: 97.2 F | SYSTOLIC BLOOD PRESSURE: 102 MMHG | BODY MASS INDEX: 21.17 KG/M2 | OXYGEN SATURATION: 97 % | WEIGHT: 124 LBS | HEART RATE: 84 BPM

## 2022-03-14 DIAGNOSIS — R73.01 ELEVATED FASTING GLUCOSE: ICD-10-CM

## 2022-03-14 DIAGNOSIS — F32.A ANXIETY AND DEPRESSION: ICD-10-CM

## 2022-03-14 DIAGNOSIS — F41.9 ANXIETY AND DEPRESSION: ICD-10-CM

## 2022-03-14 DIAGNOSIS — N32.81 OVERACTIVE BLADDER: ICD-10-CM

## 2022-03-14 DIAGNOSIS — Z85.3 HISTORY OF BREAST CANCER: ICD-10-CM

## 2022-03-14 DIAGNOSIS — I83.892 VARICOSE VEINS OF LEFT LEG WITH EDEMA: ICD-10-CM

## 2022-03-14 DIAGNOSIS — G47.00 INSOMNIA, UNSPECIFIED TYPE: Primary | ICD-10-CM

## 2022-03-14 DIAGNOSIS — I83.892 VARICOSE VEINS OF LEFT LEG WITH EDEMA: Primary | ICD-10-CM

## 2022-03-14 LAB
ESTIMATED AVERAGE GLUCOSE: 108 MG/DL
HBA1C MFR BLD: 5.4 % (ref 4–6)

## 2022-03-14 PROCEDURE — 83036 HEMOGLOBIN GLYCOSYLATED A1C: CPT

## 2022-03-14 PROCEDURE — 36415 COLL VENOUS BLD VENIPUNCTURE: CPT

## 2022-03-14 PROCEDURE — 93971 EXTREMITY STUDY: CPT

## 2022-03-14 PROCEDURE — 99213 OFFICE O/P EST LOW 20 MIN: CPT | Performed by: NURSE PRACTITIONER

## 2022-03-14 ASSESSMENT — ENCOUNTER SYMPTOMS
NAUSEA: 0
ABDOMINAL PAIN: 0
SHORTNESS OF BREATH: 0
VOMITING: 0
COUGH: 0
SORE THROAT: 0
SINUS PAIN: 0
DIARRHEA: 0

## 2022-03-14 NOTE — PATIENT INSTRUCTIONS
Patient Education        Depression Treatment: Care Instructions  Your Care Instructions     Depression is a condition that affects the way you feel, think, and act. It causes symptoms such as low energy, loss of interest in daily activities, and sadness or grouchiness that goes on for a long time. Depression is very common and affects men and women of all ages. Depression is a medical illness caused by changes in the natural chemicals in your brain. It is not a character flaw, and it does not mean that you are a bad or weak person. It does not mean that you are going crazy. It is important to know that depression can be treated. Medicines, counseling, and self-care can all help. Many people do not get help because they are embarrassed or think that they will get over the depression on their own. But some people do not get better without treatment. Follow-up care is a key part of your treatment and safety. Be sure to make and go to all appointments, and call your doctor if you are having problems. It's also a good idea to know your test results and keep a list of the medicines you take. How can you care for yourself at home? Learn about antidepressant medicines  Antidepressant medicines can improve or end the symptoms of depression. You may need to take the medicine for at least 6 months, and often longer. Keep taking your medicine even if you feel better. If you stop taking it too soon, your symptoms may come back or get worse. You may start to feel better within 1 to 3 weeks of taking antidepressant medicine. But it can take as many as 6 to 8 weeks to see more improvement. Talk to your doctor if you have problems with your medicine or if you do not notice any improvement after 3 weeks. Antidepressants can make you feel tired, dizzy, or nervous. Some people have dry mouth, constipation, headaches, sexual problems, an upset stomach, or diarrhea.  Many of these side effects are mild and go away on their own after you take the medicine for a few weeks. Some may last longer. Talk to your doctor if side effects bother you too much. You might be able to try a different medicine. If you are pregnant or breastfeeding, talk to your doctor about what medicines you can take. Learn about counseling  In many cases, counseling can work as well as medicines to treat mild to moderate depression. Counseling is done by licensed mental health providers, such as psychologists, social workers, and some types of nurses. It can be done in one-on-one sessions or in a group setting. Many people find group sessions helpful. Cognitive-behavioral therapy is a type of counseling. In this treatment, you learn how to see and change unhelpful thinking styles that may be adding to your depression. Counseling and medicines often work well when used together. When should you call for help? Call 911 anytime you think you may need emergency care. For example, call if:    · You feel you cannot stop from hurting yourself or someone else. Call your doctor now or seek immediate medical care if:    · You hear voices.     · You feel much more depressed. Watch closely for changes in your health, and be sure to contact your doctor if:    · You are having problems with your depression medicine.     · You are not getting better as expected. Where can you learn more? Go to https://CURRENT.[employer].com. org and sign in to your Mint account. Enter B288 in the [employer] box to learn more about \"Depression Treatment: Care Instructions. \"     If you do not have an account, please click on the \"Sign Up Now\" link. Current as of: June 16, 2021               Content Version: 13.1  © 6454-7275 Healthwise, Incorporated. Care instructions adapted under license by [facility] ([facility]).  If you have questions about a medical condition or this instruction, always ask your healthcare professional. Sydnie Padilla disclaims any warranty or liability for your use of this information.

## 2022-03-14 NOTE — PROGRESS NOTES
[address] [facility]  [address] [address]  Dept: [phone]  Dept Fax: [phone]    Lilian Anaya is a 60 y.o. female who presents today for her medicalconditions/complaints as noted below. Lilian Anaya is c/o of Annual Exam, Leg Swelling (left leg swelling ), and Health Maintenance (will request colonoscopy from [facility] )      HPI:     60 y.o female presents with f/u    Left leg swelling, hx of varicose veins, seeing vascular specialist later this month. Varicosities swollen and mildy swollen. History of insomnia treats with Ambien nightly, stable     History of anxiety and depression treats with Zoloft 200 mg, reports well-controlled at the moment.     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 Coli urine    Overactive bladder         Current Outpatient Medications   Medication Sig Dispense Refill    zolpidem (AMBIEN) 10 MG tablet Take 1 tablet by mouth nightly as needed for Sleep for up to 30 days. 30 tablet 0    oxybutynin (DITROPAN XL) 10 MG extended release tablet Take 1 tablet by mouth 2 times daily Take on tablet daily 180 tablet 1    sertraline (ZOLOFT) 100 MG tablet Take 2 tablets by mouth daily 60 tablet 11    letrozole (FEMARA) 2.5 MG tablet Take 2.5 mg by mouth       No current facility-administered medications for this visit. No Known Allergies    Subjective:      Review of Systems   Constitutional: Negative for chills and fever. HENT: Negative for ear pain, sinus pain and sore throat. Respiratory: Negative for cough and shortness of breath.     Cardiovascular: Positive for leg swelling. Negative for chest pain and palpitations. Gastrointestinal: Negative for abdominal pain, diarrhea, nausea and vomiting. Neurological: Negative for dizziness and headaches. All other systems reviewed and are negative.      :Objective     Physical Exam  Vitals and nursing note reviewed. Constitutional:       Appearance: Normal appearance. Cardiovascular:      Rate and Rhythm: Normal rate. Pulmonary:      Effort: Pulmonary effort is normal.      Breath sounds: Normal breath sounds. Musculoskeletal:      Left lower leg: Swelling present. Comments: Scattered vericosities   Skin:     General: Skin is warm and dry. Neurological:      General: No focal deficit present. Mental Status: She is alert and oriented to person, place, and time. /60 (Site: Right Upper Arm, Position: Sitting, Cuff Size: Medium Adult)   Pulse 84   Temp 97.2 °F (36.2 °C) (Tympanic)   Ht 5' 4\" (1.626 m)   Wt 124 lb (56.2 kg)   SpO2 97%   BMI 21.28 kg/m²     Lab Review   No visits with results within 6 Month(s) from this visit. Latest known visit with results is:   Hospital Outpatient Visit on 09/13/2021   Component Date Value    TSH 09/13/2021 1.16     Cholesterol 09/13/2021 205*    HDL 09/13/2021 83     LDL Cholesterol 09/13/2021 111     Chol/HDL Ratio 09/13/2021 2.5     Triglycerides 09/13/2021 56     VLDL 09/13/2021 NOT REPORTED        Assessment and Plan      1. Insomnia, unspecified type  2. Overactive bladder  3. Anxiety and depression  4. History of breast cancer  5. Varicose veins of left leg with edema  -     US DUP LOWER EXTREMITY LEFT JOSE; Future  6.  Elevated fasting glucose  -     Hemoglobin A1C; Future     a1c d/t presvious elevated random glucose    Doppler sent  F/u with vascular                Results for orders placed or performed in visit on 03/14/22    PAP SMEAR   Result Value Ref Range    PAP Smear, External .              Return in about 6 months (around 9/14/2022), or if symptoms worsen or fail to improve. No orders of the defined types were placed in this encounter. Patient given educational materials - see patient instructions. Discussed use, benefit, and side effects of prescribed medications. All patientquestions answered. Pt voiced understanding. Patient given educational materials - see patient instructions. Discussed use, benefit, and side effects of prescribed medications. All patientquestions answered. Pt voiced understanding. This note was transcribed using dictation with Dragon services. Efforts were made to correct any errors but some words may be misinterpreted.     Patient assumes risks associated with failure to complete recommended testing and treatments in a timely manner    Electronically signed by CHILO Rivera CNP on 3/14/2022at 2:11 PM

## 2022-03-14 NOTE — PROGRESS NOTES
Visit Information    Have you changed or started any medications since your last visit including any over-the-counter medicines, vitamins, or herbal medicines? no     Are you having any side effects from any of your medications? -  no  Have you stopped taking any of your medications? Is so, why? -  no    Have you seen any other physician or provider since your last visit? No  Have you had any other diagnostic tests since your last visit? No  Have you been seen in the emergency room and/or had an admission to a hospital since we last saw you? No  Have you had your routine dental cleaning in the past 6 months? no    Have you activated your [employer] account? If not, what are your barriers?  Yes     Patient Care Team:  CHILO Mohan CNP as PCP - General (Certified Nurse Practitioner)  CHILO Mohan CNP as PCP - [facility] Provider    Medical History Review  Past Medical, Family, and Social History reviewed and does contribute to the patient presenting condition    Health Maintenance   Topic Date Due    Hepatitis C screen  Never done    HIV screen  Never done    Colorectal Cancer Screen  Never done    Shingles Vaccine (1 of 2) Never done    COVID-19 Vaccine (2 - Booster for Mervin series) 06/07/2021    Flu vaccine (1) Never done    Cervical cancer screen  11/15/2021    Depression Screen  09/09/2022    Breast cancer screen  09/13/2022    Lipid screen  09/13/2026    DTaP/Tdap/Td vaccine (2 - Td or Tdap) 11/28/2026    Hepatitis A vaccine  Aged Out    Hepatitis B vaccine  Aged Out    Hib vaccine  Aged Out    Meningococcal (ACWY) vaccine  Aged Out    Pneumococcal 0-64 years Vaccine  Aged Out

## 2022-04-04 DIAGNOSIS — G47.00 INSOMNIA, UNSPECIFIED TYPE: ICD-10-CM

## 2022-04-04 RX ORDER — ZOLPIDEM TARTRATE 10 MG/1
10 TABLET ORAL NIGHTLY PRN
Qty: 30 TABLET | Refills: 0 | Status: SHIPPED | OUTPATIENT
Start: 2022-04-04 | End: 2022-05-04 | Stop reason: SDUPTHER

## 2022-05-04 DIAGNOSIS — G47.00 INSOMNIA, UNSPECIFIED TYPE: ICD-10-CM

## 2022-05-04 RX ORDER — ZOLPIDEM TARTRATE 10 MG/1
10 TABLET ORAL NIGHTLY PRN
Qty: 30 TABLET | Refills: 0 | Status: SHIPPED | OUTPATIENT
Start: 2022-05-04 | End: 2022-05-05 | Stop reason: SDUPTHER

## 2022-05-05 DIAGNOSIS — G47.00 INSOMNIA, UNSPECIFIED TYPE: ICD-10-CM

## 2022-05-05 RX ORDER — ZOLPIDEM TARTRATE 10 MG/1
10 TABLET ORAL NIGHTLY PRN
Qty: 30 TABLET | Refills: 0 | Status: SHIPPED | OUTPATIENT
Start: 2022-05-05 | End: 2022-06-02 | Stop reason: SDUPTHER

## 2022-05-11 RX ORDER — SERTRALINE HYDROCHLORIDE 100 MG/1
200 TABLET, FILM COATED ORAL DAILY
Qty: 60 TABLET | Refills: 5 | Status: SHIPPED | OUTPATIENT
Start: 2022-05-11 | End: 2022-10-03 | Stop reason: SDUPTHER

## 2022-06-02 DIAGNOSIS — G47.00 INSOMNIA, UNSPECIFIED TYPE: ICD-10-CM

## 2022-06-02 RX ORDER — ZOLPIDEM TARTRATE 10 MG/1
10 TABLET ORAL NIGHTLY PRN
Qty: 30 TABLET | Refills: 0 | Status: SHIPPED | OUTPATIENT
Start: 2022-06-02 | End: 2022-06-30 | Stop reason: SDUPTHER

## 2022-06-02 NOTE — TELEPHONE ENCOUNTER
----- Message from Dharmesh Cisneros sent at 6/2/2022 10:59 AM EDT -----  Subject: Refill Request    QUESTIONS  Name of Medication? zolpidem (AMBIEN) 10 MG tablet  Patient-reported dosage and instructions? 10MG once at night   How many days do you have left? 3  Preferred Pharmacy? [facility] [address] [facility] phone number (if available)? [phone]  ---------------------------------------------------------------------------  --------------  CALL BACK INFO  What is the best way for the office to contact you? OK to leave message on   voicemail  Preferred Call Back Phone Number? [phone]  ---------------------------------------------------------------------------  --------------  SCRIPT ANSWERS  Relationship to Patient?  Self

## 2022-06-21 ENCOUNTER — TELEPHONE (OUTPATIENT)
Dept: FAMILY MEDICINE CLINIC | Age: 62
End: 2022-06-21

## 2022-06-21 DIAGNOSIS — N32.81 OVERACTIVE BLADDER: ICD-10-CM

## 2022-06-21 RX ORDER — OXYBUTYNIN CHLORIDE 10 MG/1
10 TABLET, EXTENDED RELEASE ORAL 2 TIMES DAILY
Qty: 180 TABLET | Refills: 1 | Status: SHIPPED | OUTPATIENT
Start: 2022-06-21

## 2022-06-21 NOTE — TELEPHONE ENCOUNTER
----- Message from Melissa Gallegos sent at 6/21/2022 11:10 AM EDT -----  Subject: Refill Request    QUESTIONS  Name of Medication? oxybutynin (DITROPAN XL) 10 MG extended release tablet  Patient-reported dosage and instructions? 2 by mouth daily  How many days do you have left? 4  Preferred Pharmacy? [address] [place] phone number (if available)? [phone]  Additional Information for Provider? Patient stated that doctor told her   if 1 tablet daily was not working to take 2 daily, which she has been   doing. So she wants to know if the doctor can write the RX for 2 tablets   by mouth daily.   ---------------------------------------------------------------------------  --------------  CALL BACK INFO  What is the best way for the office to contact you? OK to leave message on   voicemail  Preferred Call Back Phone Number? [phone]  ---------------------------------------------------------------------------  --------------  SCRIPT ANSWERS  Relationship to Patient?  Self

## 2022-06-30 DIAGNOSIS — G47.00 INSOMNIA, UNSPECIFIED TYPE: ICD-10-CM

## 2022-06-30 RX ORDER — ZOLPIDEM TARTRATE 10 MG/1
10 TABLET ORAL NIGHTLY PRN
Qty: 30 TABLET | Refills: 2 | Status: SHIPPED | OUTPATIENT
Start: 2022-06-30 | End: 2022-07-30

## 2022-08-02 DIAGNOSIS — G47.00 INSOMNIA, UNSPECIFIED TYPE: Primary | ICD-10-CM

## 2022-08-02 RX ORDER — ZOLPIDEM TARTRATE 10 MG/1
TABLET ORAL NIGHTLY PRN
COMMUNITY
End: 2022-08-02 | Stop reason: SDUPTHER

## 2022-08-02 RX ORDER — ZOLPIDEM TARTRATE 10 MG/1
10 TABLET ORAL NIGHTLY PRN
Qty: 30 TABLET | Refills: 0 | Status: SHIPPED | OUTPATIENT
Start: 2022-08-02 | End: 2022-08-31 | Stop reason: SDUPTHER

## 2022-08-31 DIAGNOSIS — G47.00 INSOMNIA, UNSPECIFIED TYPE: ICD-10-CM

## 2022-08-31 RX ORDER — ZOLPIDEM TARTRATE 10 MG/1
10 TABLET ORAL NIGHTLY PRN
Qty: 30 TABLET | Refills: 3 | Status: SHIPPED | OUTPATIENT
Start: 2022-08-31 | End: 2022-09-30 | Stop reason: SDUPTHER

## 2022-09-30 DIAGNOSIS — G47.00 INSOMNIA, UNSPECIFIED TYPE: ICD-10-CM

## 2022-09-30 RX ORDER — ZOLPIDEM TARTRATE 10 MG/1
10 TABLET ORAL NIGHTLY PRN
Qty: 30 TABLET | Refills: 3 | Status: SHIPPED | OUTPATIENT
Start: 2022-09-30 | End: 2022-10-27 | Stop reason: SDUPTHER

## 2022-10-03 RX ORDER — SERTRALINE HYDROCHLORIDE 100 MG/1
200 TABLET, FILM COATED ORAL DAILY
Qty: 60 TABLET | Refills: 5 | Status: SHIPPED | OUTPATIENT
Start: 2022-10-03 | End: 2022-10-17

## 2022-10-10 ENCOUNTER — HOSPITAL ENCOUNTER (OUTPATIENT)
Dept: MAMMOGRAPHY | Age: 62
Discharge: HOME | End: 2022-10-12
Payer: COMMERCIAL

## 2022-10-10 DIAGNOSIS — Z12.31 VISIT FOR SCREENING MAMMOGRAM: ICD-10-CM

## 2022-10-10 PROCEDURE — 77063 BREAST TOMOSYNTHESIS BI: CPT

## 2022-10-17 ENCOUNTER — OFFICE VISIT (OUTPATIENT)
Dept: FAMILY MEDICINE CLINIC | Age: 62
End: 2022-10-17
Payer: COMMERCIAL

## 2022-10-17 ENCOUNTER — HOSPITAL ENCOUNTER (OUTPATIENT)
Age: 62
Setting detail: SPECIMEN
Discharge: HOME | End: 2022-10-17

## 2022-10-17 VITALS
DIASTOLIC BLOOD PRESSURE: 72 MMHG | BODY MASS INDEX: 20.83 KG/M2 | WEIGHT: 122 LBS | OXYGEN SATURATION: 95 % | SYSTOLIC BLOOD PRESSURE: 126 MMHG | HEIGHT: 64 IN | RESPIRATION RATE: 16 BRPM | TEMPERATURE: 98.2 F | HEART RATE: 73 BPM

## 2022-10-17 DIAGNOSIS — N32.81 OVERACTIVE BLADDER: ICD-10-CM

## 2022-10-17 DIAGNOSIS — Z12.11 COLON CANCER SCREENING: ICD-10-CM

## 2022-10-17 DIAGNOSIS — Z13.29 THYROID DISORDER SCREEN: ICD-10-CM

## 2022-10-17 DIAGNOSIS — G47.00 INSOMNIA, UNSPECIFIED TYPE: ICD-10-CM

## 2022-10-17 DIAGNOSIS — F41.9 ANXIETY AND DEPRESSION: ICD-10-CM

## 2022-10-17 DIAGNOSIS — Z13.220 LIPID SCREENING: ICD-10-CM

## 2022-10-17 DIAGNOSIS — G47.00 INSOMNIA, UNSPECIFIED TYPE: Primary | ICD-10-CM

## 2022-10-17 DIAGNOSIS — Z53.20 HIV SCREENING DECLINED: ICD-10-CM

## 2022-10-17 DIAGNOSIS — F32.A ANXIETY AND DEPRESSION: ICD-10-CM

## 2022-10-17 DIAGNOSIS — Z13.1 DIABETES MELLITUS SCREENING: ICD-10-CM

## 2022-10-17 DIAGNOSIS — Z11.59 NEED FOR HEPATITIS C SCREENING TEST: ICD-10-CM

## 2022-10-17 LAB
ABSOLUTE EOS #: 0.09 K/UL (ref 0–0.44)
ABSOLUTE IMMATURE GRANULOCYTE: 0.04 K/UL (ref 0–0.3)
ABSOLUTE LYMPH #: 1.7 K/UL (ref 1.1–3.7)
ABSOLUTE MONO #: 0.47 K/UL (ref 0.1–1.2)
ALBUMIN SERPL-MCNC: 4.7 G/DL (ref 3.5–5.2)
ALBUMIN/GLOBULIN RATIO: 1.7 (ref 1–2.5)
ALP BLD-CCNC: 62 U/L (ref 35–104)
ALT SERPL-CCNC: 15 U/L (ref 5–33)
ANION GAP SERPL CALCULATED.3IONS-SCNC: 8 MMOL/L (ref 9–17)
AST SERPL-CCNC: 24 U/L
BASOPHILS # BLD: 1 % (ref 0–2)
BASOPHILS ABSOLUTE: 0.04 K/UL (ref 0–0.2)
BILIRUB SERPL-MCNC: 0.4 MG/DL (ref 0.3–1.2)
BUN BLDV-MCNC: 13 MG/DL (ref 8–23)
CALCIUM SERPL-MCNC: 9.7 MG/DL (ref 8.6–10.4)
CHLORIDE BLD-SCNC: 104 MMOL/L (ref 98–107)
CHOLESTEROL/HDL RATIO: 2.3
CHOLESTEROL: 203 MG/DL
CO2: 28 MMOL/L (ref 20–31)
CREAT SERPL-MCNC: 0.7 MG/DL (ref 0.5–0.9)
EOSINOPHILS RELATIVE PERCENT: 2 % (ref 1–4)
GFR SERPL CREATININE-BSD FRML MDRD: >60 ML/MIN/1.73M2
GLUCOSE BLD-MCNC: 106 MG/DL (ref 70–99)
HCT VFR BLD CALC: 40.1 % (ref 36.3–47.1)
HDLC SERPL-MCNC: 87 MG/DL
HEMOGLOBIN: 12.8 G/DL (ref 11.9–15.1)
HEPATITIS C ANTIBODY: NONREACTIVE
HIV AG/AB: NONREACTIVE
IMMATURE GRANULOCYTES: 1 %
LDL CHOLESTEROL: 104 MG/DL (ref 0–130)
LYMPHOCYTES # BLD: 31 % (ref 24–43)
MCH RBC QN AUTO: 31.3 PG (ref 25.2–33.5)
MCHC RBC AUTO-ENTMCNC: 31.9 G/DL (ref 28.4–34.8)
MCV RBC AUTO: 98 FL (ref 82.6–102.9)
MONOCYTES # BLD: 9 % (ref 3–12)
NRBC AUTOMATED: 0 PER 100 WBC
PDW BLD-RTO: 13.1 % (ref 11.8–14.4)
PLATELET # BLD: 336 K/UL (ref 138–453)
PMV BLD AUTO: 10.7 FL (ref 8.1–13.5)
POTASSIUM SERPL-SCNC: 3.9 MMOL/L (ref 3.7–5.3)
RBC # BLD: 4.09 M/UL (ref 3.95–5.11)
SEG NEUTROPHILS: 56 % (ref 36–65)
SEGMENTED NEUTROPHILS ABSOLUTE COUNT: 3.19 K/UL (ref 1.5–8.1)
SODIUM BLD-SCNC: 140 MMOL/L (ref 135–144)
TOTAL PROTEIN: 7.4 G/DL (ref 6.4–8.3)
TRIGL SERPL-MCNC: 60 MG/DL
WBC # BLD: 5.5 K/UL (ref 3.5–11.3)

## 2022-10-17 PROCEDURE — 99213 OFFICE O/P EST LOW 20 MIN: CPT | Performed by: NURSE PRACTITIONER

## 2022-10-17 RX ORDER — SERTRALINE HYDROCHLORIDE 100 MG/1
100 TABLET, FILM COATED ORAL DAILY
Qty: 180 TABLET | Refills: 1 | Status: SHIPPED | OUTPATIENT
Start: 2022-10-17

## 2022-10-17 SDOH — ECONOMIC STABILITY: FOOD INSECURITY: WITHIN THE PAST 12 MONTHS, THE FOOD YOU BOUGHT JUST DIDN'T LAST AND YOU DIDN'T HAVE MONEY TO GET MORE.: NEVER TRUE

## 2022-10-17 SDOH — ECONOMIC STABILITY: FOOD INSECURITY: WITHIN THE PAST 12 MONTHS, YOU WORRIED THAT YOUR FOOD WOULD RUN OUT BEFORE YOU GOT MONEY TO BUY MORE.: NEVER TRUE

## 2022-10-17 ASSESSMENT — PATIENT HEALTH QUESTIONNAIRE - PHQ9
10. IF YOU CHECKED OFF ANY PROBLEMS, HOW DIFFICULT HAVE THESE PROBLEMS MADE IT FOR YOU TO DO YOUR WORK, TAKE CARE OF THINGS AT HOME, OR GET ALONG WITH OTHER PEOPLE: 0
SUM OF ALL RESPONSES TO PHQ9 QUESTIONS 1 & 2: 0
3. TROUBLE FALLING OR STAYING ASLEEP: 1
8. MOVING OR SPEAKING SO SLOWLY THAT OTHER PEOPLE COULD HAVE NOTICED. OR THE OPPOSITE, BEING SO FIGETY OR RESTLESS THAT YOU HAVE BEEN MOVING AROUND A LOT MORE THAN USUAL: 0
SUM OF ALL RESPONSES TO PHQ QUESTIONS 1-9: 2
5. POOR APPETITE OR OVEREATING: 0
7. TROUBLE CONCENTRATING ON THINGS, SUCH AS READING THE NEWSPAPER OR WATCHING TELEVISION: 0
SUM OF ALL RESPONSES TO PHQ QUESTIONS 1-9: 2
1. LITTLE INTEREST OR PLEASURE IN DOING THINGS: 0
SUM OF ALL RESPONSES TO PHQ QUESTIONS 1-9: 2
2. FEELING DOWN, DEPRESSED OR HOPELESS: 0
6. FEELING BAD ABOUT YOURSELF - OR THAT YOU ARE A FAILURE OR HAVE LET YOURSELF OR YOUR FAMILY DOWN: 0
SUM OF ALL RESPONSES TO PHQ QUESTIONS 1-9: 2
4. FEELING TIRED OR HAVING LITTLE ENERGY: 1
9. THOUGHTS THAT YOU WOULD BE BETTER OFF DEAD, OR OF HURTING YOURSELF: 0

## 2022-10-17 ASSESSMENT — SOCIAL DETERMINANTS OF HEALTH (SDOH): HOW HARD IS IT FOR YOU TO PAY FOR THE VERY BASICS LIKE FOOD, HOUSING, MEDICAL CARE, AND HEATING?: NOT HARD AT ALL

## 2022-10-17 ASSESSMENT — ENCOUNTER SYMPTOMS
SHORTNESS OF BREATH: 0
VOMITING: 0
SINUS PAIN: 0
COUGH: 0
DIARRHEA: 0
SORE THROAT: 0
NAUSEA: 0
ABDOMINAL PAIN: 0

## 2022-10-17 NOTE — PATIENT INSTRUCTIONS
Patient Education        Anxiety Disorder: Care Instructions  Your Care Instructions     Anxiety is a normal reaction to stress. Difficult situations can cause you to have symptoms such as sweaty palms and a nervous feeling. In an anxiety disorder, the symptoms are far more severe. Constant worry, muscle tension, trouble sleeping, nausea and diarrhea, and other symptoms can make normal daily activities difficult or impossible. These symptoms may occur for no reason, and they can affect your work, school, or social life. Medicines, counseling, and self-care can all help. Follow-up care is a key part of your treatment and safety. Be sure to make and go to all appointments, and call your doctor if you are having problems. It's also a good idea to know your test results and keep a list of the medicines you take. How can you care for yourself at home? Take medicines exactly as directed. Call your doctor if you think you are having a problem with your medicine. Go to your counseling sessions and follow-up appointments. Recognize and accept your anxiety. Then, when you are in a situation that makes you anxious, say to yourself, \"This is not an emergency. I feel uncomfortable, but I am not in danger. I can keep going even if I feel anxious. \"  Be kind to your body:  Relieve tension with exercise or a massage. Get enough rest.  Avoid alcohol, caffeine, nicotine, and illegal drugs. They can increase your anxiety level and cause sleep problems. Learn and do relaxation techniques. See below for more about these techniques. Engage your mind. Get out and do something you enjoy. Go to a funny movie, or take a walk or hike. Plan your day. Having too much or too little to do can make you anxious. Keep a record of your symptoms. Discuss your fears with a good friend or family member, or join a support group for people with similar problems. Talking to others sometimes relieves stress.   Get involved in social groups, or volunteer to help others. Being alone sometimes makes things seem worse than they are. Get at least 30 minutes of exercise on most days of the week to relieve stress. Walking is a good choice. You also may want to do other activities, such as running, swimming, cycling, or playing tennis or team sports. Relaxation techniques  Do relaxation exercises 10 to 20 minutes a day. You can play soothing, relaxing music while you do them, if you wish. Tell others in your house that you are going to do your relaxation exercises. Ask them not to disturb you. Find a comfortable place, away from all distractions and noise. Lie down on your back, or sit with your back straight. Focus on your breathing. Make it slow and steady. Breathe in through your nose. Breathe out through either your nose or mouth. Breathe deeply, filling up the area between your navel and your rib cage. Breathe so that your belly goes up and down. Do not hold your breath. Breathe like this for 5 to 10 minutes. Notice the feeling of calmness throughout your whole body. As you continue to breathe slowly and deeply, relax by doing the following for another 5 to 10 minutes:  Tighten and relax each muscle group in your body. You can begin at your toes and work your way up to your head. Imagine your muscle groups relaxing and becoming heavy. Empty your mind of all thoughts. Let yourself relax more and more deeply. Become aware of the state of calmness that surrounds you. When your relaxation time is over, you can bring yourself back to alertness by moving your fingers and toes and then your hands and feet and then stretching and moving your entire body. Sometimes people fall asleep during relaxation, but they usually wake up shortly afterward. Always give yourself time to return to full alertness before you drive a car or do anything that might cause an accident if you are not fully alert. Never play a relaxation tape while you drive a car.   When

## 2022-10-17 NOTE — PROGRESS NOTES
Visit Information    Have you changed or started any medications since your last visit including any over-the-counter medicines, vitamins, or herbal medicines? no   Are you having any side effects from any of your medications? -  no  Have you stopped taking any of your medications? Is so, why? -  no    Have you seen any other physician or provider since your last visit? Yes - Records Obtained  Have you had any other diagnostic tests since your last visit? No  Have you been seen in the emergency room and/or had an admission to a hospital since we last saw you? No  Have you had your routine dental cleaning in the past 6 months? yes -     Have you activated your [employer] account? If not, what are your barriers?  Yes     Patient Care Team:  CHILO Hutchinson CNP as PCP - General (Certified Nurse Practitioner)  CHILO Hutchinson CNP as PCP - [facility] Provider    Medical History Review  Past Medical, Family, and Social History reviewed and does contribute to the patient presenting condition    Health Maintenance   Topic Date Due    HIV screen  Never done    Hepatitis C screen  Never done    Colorectal Cancer Screen  Never done    Shingles vaccine (1 of 2) Never done    COVID-19 Vaccine (2 - Booster for Mervin series) 06/07/2021    Flu vaccine (1) Never done    Depression Monitoring  09/09/2022    Cervical cancer screen  01/28/2023    Breast cancer screen  10/10/2023    Lipids  09/13/2026    DTaP/Tdap/Td vaccine (2 - Td or Tdap) 11/28/2026    Hepatitis A vaccine  Aged Out    Hib vaccine  Aged Out    Meningococcal (ACWY) vaccine  Aged Out    Pneumococcal 0-64 years Vaccine  Aged Out

## 2022-10-17 NOTE — PROGRESS NOTES
[facility]  [address] [address]  Dept: [phone]  Dept Fax: [phone]    Amber Ndiaye is a 60 y.o. female who presents today for her medicalconditions/complaints as noted below. Amber Ndiaye is c/o of Anxiety      HPI:     60 y.o female presents with f/u     Hx of varicose veins, has previously seen vascular. .     History of insomnia treats with Ambien nightly, stable     History of anxiety and depression treats with Zoloft 200 mg, reports well-controlled at the moment. Wanting to try dose reduction. History of overactive bladder treats with oxybutynin, stable     History of breast cancer takes Femara, follows with oncology, has seen oncology no adjustments needed         Past Medical History:   Diagnosis Date    Acute insomnia     Anxiety     Breast cancer ([facility] [address] 07/2020    right breast    Depression     MDRO (multiple drug resistant organisms) resistance 10/10/2018    E. Coli urine    Overactive bladder         Current Outpatient Medications   Medication Sig Dispense Refill    sertraline (ZOLOFT) 100 MG tablet Take 1 tablet by mouth daily 180 tablet 1    zolpidem (AMBIEN) 10 MG tablet Take 1 tablet by mouth nightly as needed for Sleep for up to 30 days. Take by mouth nightly as needed for Sleep. 30 tablet 3    oxybutynin (DITROPAN XL) 10 MG extended release tablet Take 1 tablet by mouth 2 times daily Take on tablet daily 180 tablet 1    letrozole (FEMARA) 2.5 MG tablet Take 2.5 mg by mouth       No current facility-administered medications for this visit. No Known Allergies    Subjective:      Review of Systems   Constitutional:  Negative for chills and fever. HENT:  Negative for ear pain, sinus pain and sore throat. Respiratory:  Negative for cough and shortness of breath. Cardiovascular:  Negative for chest pain and palpitations.    Gastrointestinal:  Negative for abdominal pain, diarrhea, nausea and vomiting. Neurological:  Negative for dizziness and headaches. All other systems reviewed and are negative.    :Objective     Physical Exam  Vitals and nursing note reviewed. Constitutional:       Appearance: Normal appearance. Cardiovascular:      Rate and Rhythm: Normal rate. Pulmonary:      Effort: Pulmonary effort is normal.      Breath sounds: Normal breath sounds. Skin:     General: Skin is warm and dry. Neurological:      General: No focal deficit present. Mental Status: She is alert and oriented to person, place, and time. /72 (Site: Right Upper Arm, Position: Sitting, Cuff Size: Medium Adult)   Pulse 73   Temp 98.2 °F (36.8 °C) (Tympanic)   Resp 16   Ht 5' 4\" (1.626 m)   Wt 122 lb (55.3 kg)   SpO2 95%   BMI 20.94 kg/m²     Lab Review   No visits with results within 6 Month(s) from this visit. Latest known visit with results is:   Hospital Outpatient Visit on 03/14/2022   Component Date Value    Hemoglobin A1C 03/14/2022 5.4     Estimated Avg Glucose 03/14/2022 108        Assessment and Plan      1. Insomnia, unspecified type  -     CBC with Auto Differential; Future  2. Overactive bladder  -     CBC with Auto Differential; Future  3. Anxiety and depression  -     sertraline (ZOLOFT) 100 MG tablet; Take 1 tablet by mouth daily, Disp-180 tablet, R-1Normal  -     CBC with Auto Differential; Future  4. Lipid screening  -     CBC with Auto Differential; Future  -     Lipid Panel; Future  5. Thyroid disorder screen  -     CBC with Auto Differential; Future  6. Colon cancer screening  -     CBC with Auto Differential; Future  -     Fecal DNA Colorectal cancer screening (Cologuard)  7. Diabetes mellitus screening  -     CBC with Auto Differential; Future  -     Comprehensive Metabolic Panel; Future  8. HIV screening declined  -     HIV Screen; Future  9. Need for hepatitis C screening test  -     Hepatitis C Antibody;  Future       Labs ordeed  Cologuard sent  Mic reviewed  Zoloft reduced to 100  Med check 3 months, sooner prn        No results found for this visit on 10/17/22. Return in about 6 months (around 4/17/2023), or if symptoms worsen or fail to improve. Orders Placed This Encounter   Medications    sertraline (ZOLOFT) 100 MG tablet     Sig: Take 1 tablet by mouth daily     Dispense:  180 tablet     Refill:  1        Patient given educational materials - see patient instructions. Discussed use, benefit, and side effects of prescribed medications. All patientquestions answered. Pt voiced understanding. Patient given educational materials - see patient instructions. Discussed use, benefit, and side effects of prescribed medications. All patientquestions answered. Pt voiced understanding. This note was transcribed using dictation with Dragon services. Efforts were made to correct any errors but some words may be misinterpreted.     Patient assumes risks associated with failure to complete recommended testing and treatments in a timely manner    Electronically signed by CHILO Aranda CNP on 10/17/2022at 11:25 AM

## 2022-10-18 DIAGNOSIS — R73.01 IFG (IMPAIRED FASTING GLUCOSE): Primary | ICD-10-CM

## 2022-10-20 LAB
ESTIMATED AVERAGE GLUCOSE: 103 MG/DL
HBA1C MFR BLD: 5.2 % (ref 4–6)

## 2022-10-27 DIAGNOSIS — G47.00 INSOMNIA, UNSPECIFIED TYPE: ICD-10-CM

## 2022-10-27 RX ORDER — ZOLPIDEM TARTRATE 10 MG/1
10 TABLET ORAL NIGHTLY PRN
Qty: 30 TABLET | Refills: 3 | Status: SHIPPED | OUTPATIENT
Start: 2022-10-27 | End: 2022-11-26

## 2022-12-15 ENCOUNTER — OFFICE VISIT (OUTPATIENT)
Dept: FAMILY MEDICINE CLINIC | Age: 62
End: 2022-12-15
Payer: COMMERCIAL

## 2022-12-15 VITALS
SYSTOLIC BLOOD PRESSURE: 108 MMHG | HEIGHT: 64 IN | WEIGHT: 127 LBS | HEART RATE: 88 BPM | RESPIRATION RATE: 16 BRPM | TEMPERATURE: 97.6 F | OXYGEN SATURATION: 98 % | DIASTOLIC BLOOD PRESSURE: 74 MMHG | BODY MASS INDEX: 21.68 KG/M2

## 2022-12-15 DIAGNOSIS — R07.9 CHEST PAIN, UNSPECIFIED TYPE: ICD-10-CM

## 2022-12-15 DIAGNOSIS — R10.13 DYSPEPSIA: Primary | ICD-10-CM

## 2022-12-15 PROCEDURE — 99213 OFFICE O/P EST LOW 20 MIN: CPT | Performed by: NURSE PRACTITIONER

## 2022-12-15 RX ORDER — PANTOPRAZOLE SODIUM 40 MG/1
40 TABLET, DELAYED RELEASE ORAL
Qty: 30 TABLET | Refills: 2 | Status: SHIPPED | OUTPATIENT
Start: 2022-12-15

## 2022-12-15 ASSESSMENT — ENCOUNTER SYMPTOMS
SINUS PAIN: 0
SHORTNESS OF BREATH: 0
ABDOMINAL PAIN: 0
COUGH: 0
DIARRHEA: 0
VOMITING: 0
SORE THROAT: 0
NAUSEA: 0

## 2022-12-15 NOTE — PROGRESS NOTES
[address] [facility]  [address] [place] [address]  Dept: [phone]  Dept Fax: [phone]    Daniela Newsome is a 60 y.o. female who presents today for her medicalconditions/complaints as noted below. Daniela Newsome is c/o of Other (Patient states that she has been having a burning sensation in chest.)      HPI:     60 y.o female presents with f/u    Having some chest pressure, improved. Was worse several days. Tried to burp tums, helped and saw improvement. Tried to go to walk in clinic did not want to wait. Declined ER     Hx of varicose veins, has previously seen vascular. .     History of insomnia treats with Ambien nightly, stable     History of anxiety and depression treats with Zoloft 200 mg, reports well-controlled at the moment. Wanting to try dose reduction. History of overactive bladder treats with oxybutynin, stable     History of breast cancer takes Femara, follows with oncology, has seen oncology no adjustments needed      Past Medical History:   Diagnosis Date    Acute insomnia     Anxiety     Breast cancer ([facility] [address] 07/2020    right breast    Depression     MDRO (multiple drug resistant organisms) resistance 10/10/2018    E. Coli urine    Overactive bladder         Current Outpatient Medications   Medication Sig Dispense Refill    pantoprazole (PROTONIX) 40 MG tablet Take 1 tablet by mouth every morning (before breakfast) 30 tablet 2    sertraline (ZOLOFT) 100 MG tablet Take 1 tablet by mouth daily 180 tablet 1    oxybutynin (DITROPAN XL) 10 MG extended release tablet Take 1 tablet by mouth 2 times daily Take on tablet daily 180 tablet 1    letrozole (FEMARA) 2.5 MG tablet Take 2.5 mg by mouth       No current facility-administered medications for this visit. No Known Allergies    Subjective:      Review of Systems   Constitutional:  Negative for chills and fever. HENT:  Negative for ear pain, sinus pain and sore throat. Respiratory:  Negative for cough and shortness of breath. Cardiovascular:  Positive for chest pain. Negative for palpitations. Gastrointestinal:  Negative for abdominal pain, diarrhea, nausea and vomiting. Neurological:  Negative for dizziness and headaches. All other systems reviewed and are negative.    :Objective     Physical Exam  Vitals and nursing note reviewed. Constitutional:       General: She is not in acute distress. Appearance: Normal appearance. She is not toxic-appearing. Cardiovascular:      Rate and Rhythm: Normal rate. Pulmonary:      Effort: Pulmonary effort is normal.      Breath sounds: Normal breath sounds. Neurological:      General: No focal deficit present. Mental Status: She is alert and oriented to person, place, and time.      /74 (Site: Right Upper Arm, Position: Sitting, Cuff Size: Medium Adult)   Pulse 88   Temp 97.6 °F (36.4 °C) (Tympanic)   Resp 16   Ht 5' 4\" (1.626 m)   Wt 127 lb (57.6 kg)   SpO2 98%   BMI 21.80 kg/m²     Lab Review   Hospital Outpatient Visit on 10/17/2022   Component Date Value    Hepatitis C Ab 10/17/2022 NONREACTIVE     HIV Ag/Ab 10/17/2022 NONREACTIVE     Cholesterol 10/17/2022 203 (A)     HDL 10/17/2022 87     LDL Cholesterol 10/17/2022 104     Chol/HDL Ratio 10/17/2022 2.3     Triglycerides 10/17/2022 60     Glucose 10/17/2022 106 (A)     BUN 10/17/2022 13     Creatinine 10/17/2022 0.70     Est, Glom Filt Rate 10/17/2022 >60     Calcium 10/17/2022 9.7     Sodium 10/17/2022 140     Potassium 10/17/2022 3.9     Chloride 10/17/2022 104     CO2 10/17/2022 28     Anion Gap 10/17/2022 8 (A)     Alkaline Phosphatase 10/17/2022 62     ALT 10/17/2022 15     AST 10/17/2022 24     Total Bilirubin 10/17/2022 0.4     Total Protein 10/17/2022 7.4     Albumin 10/17/2022 4.7     Albumin/Globulin Ratio 10/17/2022 1.7     WBC 10/17/2022 5.5     RBC 10/17/2022 4.09     Hemoglobin 10/17/2022 12.8     Hematocrit 10/17/2022 40.1     MCV 10/17/2022 98.0     MCH 10/17/2022 31.3     MCHC 10/17/2022 31.9     RDW 10/17/2022 13.1     Platelets 45/34/2993 336     MPV 10/17/2022 10.7     NRBC Automated 10/17/2022 0.0     Seg Neutrophils 10/17/2022 56     Lymphocytes 10/17/2022 31     Monocytes 10/17/2022 9     Eosinophils % 10/17/2022 2     Basophils 10/17/2022 1     Immature Granulocytes 10/17/2022 1 (A)     Segs Absolute 10/17/2022 3.19     Absolute Lymph # 10/17/2022 1.70     Absolute Mono # 10/17/2022 0.47     Absolute Eos # 10/17/2022 0.09     Basophils Absolute 10/17/2022 0.04     Absolute Immature Granul* 10/17/2022 0.04     Hemoglobin A1C 10/17/2022 5.2     Estimated Avg Glucose 10/17/2022 103        Assessment and Plan      1. Dyspepsia  -     pantoprazole (PROTONIX) 40 MG tablet; Take 1 tablet by mouth every morning (before breakfast), Disp-30 tablet, R-2Normal  2. Chest pain, unspecified type  -     NM MYOCARDIAL SPECT REST EXERCISE OR RX; Future       Labs reviewed up to date  Recommend protonix daily, can increase to bid  Stress test ordered  ER if sx worsen acutely  F/u after testing to review        No results found for this visit on 12/15/22. Return in about 3 months (around 3/15/2023), or if symptoms worsen or fail to improve. Orders Placed This Encounter   Medications    pantoprazole (PROTONIX) 40 MG tablet     Sig: Take 1 tablet by mouth every morning (before breakfast)     Dispense:  30 tablet     Refill:  2        Patient given educational materials - see patient instructions. Discussed use, benefit, and side effects of prescribed medications. All patientquestions answered. Pt voiced understanding. Patient given educational materials - see patient instructions. Discussed use, benefit, and side effects of prescribed medications. All patientquestions answered. Pt voiced understanding. This note was transcribed using dictation with Dragon services.  Efforts were made to correct any errors but some words may be misinterpreted.     Patient assumes risks associated with failure to complete recommended testing and treatments in a timely manner    Electronically signed by CHILO Macias CNP on 12/15/2022at 4:50 PM

## 2022-12-29 DIAGNOSIS — G47.00 INSOMNIA, UNSPECIFIED TYPE: ICD-10-CM

## 2022-12-29 RX ORDER — ZOLPIDEM TARTRATE 10 MG/1
10 TABLET ORAL NIGHTLY PRN
Qty: 30 TABLET | Refills: 3 | Status: SHIPPED | OUTPATIENT
Start: 2022-12-29 | End: 2023-01-28

## 2022-12-29 NOTE — TELEPHONE ENCOUNTER
----- Message from Sony Guzman sent at 12/29/2022  8:35 AM EST -----  Subject: Refill Request    QUESTIONS  Name of Medication? zolpidem (AMBIEN) 10 MG tablet  Patient-reported dosage and instructions? Take 1 tablet by mouth nightly   as needed for Sleep for up to 30 days. How many days do you have left? 0  Preferred Pharmacy? [facility] [address] [facility] phone number (if available)? [phone]  ---------------------------------------------------------------------------  --------------  CALL BACK INFO  What is the best way for the office to contact you? OK to leave message on   voicemail  Preferred Call Back Phone Number? [phone]  ---------------------------------------------------------------------------  --------------  SCRIPT ANSWERS  Relationship to Patient?  Self

## 2023-01-10 DIAGNOSIS — R10.13 DYSPEPSIA: ICD-10-CM

## 2023-01-10 RX ORDER — PANTOPRAZOLE SODIUM 40 MG/1
TABLET, DELAYED RELEASE ORAL
Qty: 30 TABLET | Refills: 2 | Status: SHIPPED | OUTPATIENT
Start: 2023-01-10

## 2023-01-27 DIAGNOSIS — G47.00 INSOMNIA, UNSPECIFIED TYPE: ICD-10-CM

## 2023-01-27 RX ORDER — ZOLPIDEM TARTRATE 10 MG/1
10 TABLET ORAL NIGHTLY PRN
Qty: 30 TABLET | Refills: 3 | Status: SHIPPED | OUTPATIENT
Start: 2023-01-27 | End: 2023-02-26

## 2023-01-27 NOTE — TELEPHONE ENCOUNTER
Mary Krueger is calling to request a refill on the following medication(s):    Last Visit Date (If Applicable):  81/41/2634    Next Visit Date:    Visit date not found    Medication Request:  Requested Prescriptions     Pending Prescriptions Disp Refills    zolpidem (AMBIEN) 10 MG tablet 30 tablet 3     Sig: Take 1 tablet by mouth nightly as needed for Sleep for up to 30 days. Take by mouth nightly as needed for Sleep.

## 2023-02-13 ENCOUNTER — COMMUNITY OUTREACH (OUTPATIENT)
Dept: FAMILY MEDICINE CLINIC | Age: 63
End: 2023-02-13

## 2023-02-13 NOTE — PROGRESS NOTES
From: Nataly Haile  To: Batsheva Parra  Sent: 3/22/2022 12:23 PM CDT  Subject: Medication    Medication : Dr Parra, should I be having another Reclast Injection yet this year? I thought you wanted me to have one more.   Patient's HM shows they are overdue for Colorectal Screening. Care Everywhere and  files searched. No results to attach to order nor HM updated.

## 2023-02-20 DIAGNOSIS — N32.81 OVERACTIVE BLADDER: ICD-10-CM

## 2023-02-20 RX ORDER — OXYBUTYNIN CHLORIDE 10 MG/1
TABLET, EXTENDED RELEASE ORAL
Qty: 180 TABLET | Refills: 1 | Status: SHIPPED | OUTPATIENT
Start: 2023-02-20

## 2023-04-10 DIAGNOSIS — R10.13 DYSPEPSIA: ICD-10-CM

## 2023-04-10 RX ORDER — PANTOPRAZOLE SODIUM 40 MG/1
TABLET, DELAYED RELEASE ORAL
Qty: 90 TABLET | Refills: 1 | Status: SHIPPED | OUTPATIENT
Start: 2023-04-10

## 2023-04-17 ENCOUNTER — HOSPITAL ENCOUNTER (OUTPATIENT)
Dept: GENERAL RADIOLOGY | Age: 63
Discharge: HOME | End: 2023-04-19
Payer: COMMERCIAL

## 2023-04-17 ENCOUNTER — HOSPITAL ENCOUNTER (OUTPATIENT)
Age: 63
Discharge: HOME | End: 2023-04-19

## 2023-04-17 ENCOUNTER — HOSPITAL ENCOUNTER (OUTPATIENT)
Dept: CT IMAGING | Age: 63
Discharge: HOME | End: 2023-04-19
Payer: COMMERCIAL

## 2023-04-17 DIAGNOSIS — M54.42 CHRONIC BILATERAL LOW BACK PAIN WITH BILATERAL SCIATICA: ICD-10-CM

## 2023-04-17 DIAGNOSIS — M79.605 BILATERAL LEG PAIN: ICD-10-CM

## 2023-04-17 DIAGNOSIS — M79.604 BILATERAL LEG PAIN: ICD-10-CM

## 2023-04-17 DIAGNOSIS — M54.41 CHRONIC BILATERAL LOW BACK PAIN WITH BILATERAL SCIATICA: ICD-10-CM

## 2023-04-17 DIAGNOSIS — R07.9 CHEST PAIN, UNSPECIFIED TYPE: ICD-10-CM

## 2023-04-17 DIAGNOSIS — G89.29 CHRONIC BILATERAL LOW BACK PAIN WITH BILATERAL SCIATICA: ICD-10-CM

## 2023-04-17 PROCEDURE — 71250 CT THORAX DX C-: CPT

## 2023-04-17 PROCEDURE — 72170 X-RAY EXAM OF PELVIS: CPT

## 2023-04-17 PROCEDURE — 72100 X-RAY EXAM L-S SPINE 2/3 VWS: CPT

## 2023-04-18 DIAGNOSIS — R91.8 ABNORMAL CT SCAN, LUNG: Primary | ICD-10-CM

## 2023-04-18 RX ORDER — DOXYCYCLINE HYCLATE 100 MG
100 TABLET ORAL 2 TIMES DAILY
Qty: 14 TABLET | Refills: 0 | Status: SHIPPED | OUTPATIENT
Start: 2023-04-18 | End: 2023-04-25

## 2023-04-24 ENCOUNTER — TELEPHONE (OUTPATIENT)
Dept: FAMILY MEDICINE CLINIC | Age: 63
End: 2023-04-24

## 2023-04-24 DIAGNOSIS — R91.8 ABNORMAL CT LUNG SCREENING: Primary | ICD-10-CM

## 2023-04-24 NOTE — TELEPHONE ENCOUNTER
Patient contacted the office and states that she is on an antibiotic for 7 days. Patient is asking if she can get a CT sooner than 3 months due to her cancer history and to be sure that it is nothing else. Patients family/ patient feels like 3 months is to long to wait. Please advise.

## 2023-05-25 DIAGNOSIS — G47.00 INSOMNIA, UNSPECIFIED TYPE: Primary | ICD-10-CM

## 2023-05-25 RX ORDER — ZOLPIDEM TARTRATE 10 MG/1
10 TABLET ORAL NIGHTLY PRN
Qty: 30 TABLET | Refills: 0 | Status: SHIPPED | OUTPATIENT
Start: 2023-05-25 | End: 2023-06-24

## 2023-05-25 RX ORDER — ZOLPIDEM TARTRATE 10 MG/1
TABLET ORAL NIGHTLY PRN
COMMUNITY
End: 2023-05-25 | Stop reason: SDUPTHER

## 2023-06-20 ENCOUNTER — HOSPITAL ENCOUNTER (OUTPATIENT)
Dept: CT IMAGING | Age: 63
Discharge: HOME | End: 2023-06-22
Payer: COMMERCIAL

## 2023-06-20 DIAGNOSIS — R91.8 ABNORMAL CT SCAN, LUNG: ICD-10-CM

## 2023-06-20 PROCEDURE — 71250 CT THORAX DX C-: CPT

## 2023-06-23 DIAGNOSIS — G47.00 INSOMNIA, UNSPECIFIED TYPE: ICD-10-CM

## 2023-06-23 RX ORDER — ZOLPIDEM TARTRATE 10 MG/1
10 TABLET ORAL NIGHTLY PRN
Qty: 30 TABLET | Refills: 0 | Status: SHIPPED | OUTPATIENT
Start: 2023-06-23 | End: 2023-07-23

## 2023-07-10 ENCOUNTER — OFFICE VISIT (OUTPATIENT)
Dept: FAMILY MEDICINE CLINIC | Age: 63
End: 2023-07-10
Payer: COMMERCIAL

## 2023-07-10 VITALS
DIASTOLIC BLOOD PRESSURE: 66 MMHG | BODY MASS INDEX: 21.28 KG/M2 | SYSTOLIC BLOOD PRESSURE: 110 MMHG | OXYGEN SATURATION: 98 % | HEART RATE: 68 BPM | WEIGHT: 124 LBS

## 2023-07-10 DIAGNOSIS — R91.8 ABNORMAL CT LUNG SCREENING: Primary | ICD-10-CM

## 2023-07-10 DIAGNOSIS — N32.81 OVERACTIVE BLADDER: ICD-10-CM

## 2023-07-10 DIAGNOSIS — G47.00 INSOMNIA, UNSPECIFIED TYPE: ICD-10-CM

## 2023-07-10 DIAGNOSIS — F32.A ANXIETY AND DEPRESSION: ICD-10-CM

## 2023-07-10 DIAGNOSIS — Z13.1 DIABETES MELLITUS SCREENING: ICD-10-CM

## 2023-07-10 DIAGNOSIS — F41.9 ANXIETY AND DEPRESSION: ICD-10-CM

## 2023-07-10 DIAGNOSIS — Z13.220 LIPID SCREENING: ICD-10-CM

## 2023-07-10 DIAGNOSIS — R10.13 DYSPEPSIA: ICD-10-CM

## 2023-07-10 PROCEDURE — 99214 OFFICE O/P EST MOD 30 MIN: CPT | Performed by: NURSE PRACTITIONER

## 2023-07-10 ASSESSMENT — ENCOUNTER SYMPTOMS
NAUSEA: 0
SHORTNESS OF BREATH: 0
ABDOMINAL PAIN: 0
DIARRHEA: 0
VOMITING: 0
COUGH: 0
SINUS PAIN: 0
SORE THROAT: 0

## 2023-07-10 NOTE — PROGRESS NOTES
[facility]  [address] [address]  Dept: [phone]  Dept Fax: [phone]    Arabella Cortes is a 58 y.o. female who presents today for her medicalconditions/complaints as noted below. Arabella Cortes is c/o of Results (Pt in to discuss CT results) and 3 Month Follow-Up      HPI:     58 y.o female presents with f/u     Chronic low back pain mild arthritis noted on xrays to low back and pelvis. No meds tried. No acute injury or trauma. Ct chest showing pulmonary nodule, 2 month repeat completed and stable, will repeat scanin 6 months, referred to pulm, has PFT scheduled. Hx of varicose veins, has previously seen vascular. .     History of insomnia treats with Ambien nightly, stable     History of anxiety and depression treats with Zoloft 100 mg, reports well-controlled at the moment. History of overactive bladder treats with oxybutynin, stable     History of breast cancer takes Femara, follows with oncology, has seen oncology no adjustments needed. Last sugar negative in October. Past Medical History:   Diagnosis Date    Acute insomnia     Anxiety     Breast cancer ([address]) 07/2020    right breast    Depression     MDRO (multiple drug resistant organisms) resistance 10/10/2018    E. Coli urine    Overactive bladder         Current Outpatient Medications   Medication Sig Dispense Refill    zolpidem (AMBIEN) 10 MG tablet Take 1 tablet by mouth nightly as needed for Sleep for up to 30 days.  Max Daily Amount: 10 mg 30 tablet 0    pantoprazole (PROTONIX) 40 MG tablet TAKE 1 TABLET BY MOUTH EVERY DAY BEFORE BREAKFAST 90 tablet 1    oxybutynin (DITROPAN-XL) 10 MG extended release tablet TAKE 1 TABLET BY MOUTH 2 TIMES DAILY 180 tablet 1    sertraline (ZOLOFT) 100 MG tablet Take 1 tablet by mouth daily 180 tablet 1    letrozole (FEMARA) 2.5 MG tablet Take 1 tablet by mouth       No current facility-administered medications for

## 2023-07-24 DIAGNOSIS — G47.00 INSOMNIA, UNSPECIFIED TYPE: Primary | ICD-10-CM

## 2023-07-24 RX ORDER — ZOLPIDEM TARTRATE 10 MG/1
TABLET ORAL NIGHTLY PRN
COMMUNITY
End: 2023-07-24 | Stop reason: SDUPTHER

## 2023-07-24 RX ORDER — ZOLPIDEM TARTRATE 10 MG/1
10 TABLET ORAL NIGHTLY PRN
Qty: 30 TABLET | Refills: 0 | Status: SHIPPED | OUTPATIENT
Start: 2023-07-24 | End: 2023-08-23

## 2023-08-07 DIAGNOSIS — F41.9 ANXIETY AND DEPRESSION: ICD-10-CM

## 2023-08-07 DIAGNOSIS — F32.A ANXIETY AND DEPRESSION: ICD-10-CM

## 2023-08-07 RX ORDER — SERTRALINE HYDROCHLORIDE 100 MG/1
100 TABLET, FILM COATED ORAL DAILY
Qty: 180 TABLET | Refills: 1 | Status: SHIPPED | OUTPATIENT
Start: 2023-08-07

## 2023-08-16 DIAGNOSIS — N32.81 OVERACTIVE BLADDER: ICD-10-CM

## 2023-08-16 RX ORDER — OXYBUTYNIN CHLORIDE 10 MG/1
10 TABLET, EXTENDED RELEASE ORAL 2 TIMES DAILY
Qty: 180 TABLET | Refills: 1 | Status: SHIPPED | OUTPATIENT
Start: 2023-08-16

## 2023-08-23 DIAGNOSIS — G47.00 INSOMNIA, UNSPECIFIED TYPE: ICD-10-CM

## 2023-08-23 RX ORDER — ZOLPIDEM TARTRATE 10 MG/1
10 TABLET ORAL NIGHTLY PRN
Qty: 30 TABLET | Refills: 0 | Status: SHIPPED | OUTPATIENT
Start: 2023-08-23 | End: 2023-09-22

## 2023-09-25 DIAGNOSIS — F32.A ANXIETY AND DEPRESSION: Primary | ICD-10-CM

## 2023-09-25 DIAGNOSIS — F41.9 ANXIETY AND DEPRESSION: Primary | ICD-10-CM

## 2023-09-25 RX ORDER — ZOLPIDEM TARTRATE 10 MG/1
TABLET ORAL NIGHTLY PRN
COMMUNITY
End: 2023-09-25 | Stop reason: SDUPTHER

## 2023-09-25 RX ORDER — ZOLPIDEM TARTRATE 10 MG/1
10 TABLET ORAL NIGHTLY PRN
Qty: 30 TABLET | Refills: 1 | Status: SHIPPED | OUTPATIENT
Start: 2023-09-25 | End: 2023-11-24

## 2023-10-24 DIAGNOSIS — F41.9 ANXIETY AND DEPRESSION: ICD-10-CM

## 2023-10-24 DIAGNOSIS — F32.A ANXIETY AND DEPRESSION: ICD-10-CM

## 2023-10-24 RX ORDER — ZOLPIDEM TARTRATE 10 MG/1
10 TABLET ORAL NIGHTLY PRN
Qty: 30 TABLET | Refills: 1 | Status: SHIPPED | OUTPATIENT
Start: 2023-10-24 | End: 2023-12-23

## 2023-10-30 ENCOUNTER — OFFICE VISIT (OUTPATIENT)
Dept: FAMILY MEDICINE CLINIC | Age: 63
End: 2023-10-30
Payer: COMMERCIAL

## 2023-10-30 ENCOUNTER — HOSPITAL ENCOUNTER (OUTPATIENT)
Age: 63
Setting detail: SPECIMEN
Discharge: HOME | End: 2023-10-30

## 2023-10-30 VITALS
DIASTOLIC BLOOD PRESSURE: 70 MMHG | BODY MASS INDEX: 21.65 KG/M2 | OXYGEN SATURATION: 98 % | SYSTOLIC BLOOD PRESSURE: 110 MMHG | WEIGHT: 126.8 LBS | TEMPERATURE: 97.8 F | HEIGHT: 64 IN | HEART RATE: 76 BPM

## 2023-10-30 DIAGNOSIS — F41.9 ANXIETY AND DEPRESSION: ICD-10-CM

## 2023-10-30 DIAGNOSIS — F32.A ANXIETY AND DEPRESSION: ICD-10-CM

## 2023-10-30 DIAGNOSIS — G47.00 INSOMNIA, UNSPECIFIED TYPE: ICD-10-CM

## 2023-10-30 DIAGNOSIS — Z13.220 LIPID SCREENING: ICD-10-CM

## 2023-10-30 DIAGNOSIS — I83.893 VARICOSE VEINS OF BOTH LEGS WITH EDEMA: Primary | ICD-10-CM

## 2023-10-30 DIAGNOSIS — R91.8 ABNORMAL CT LUNG SCREENING: ICD-10-CM

## 2023-10-30 DIAGNOSIS — Z13.1 DIABETES MELLITUS SCREENING: ICD-10-CM

## 2023-10-30 DIAGNOSIS — R10.13 DYSPEPSIA: ICD-10-CM

## 2023-10-30 DIAGNOSIS — N32.81 OVERACTIVE BLADDER: ICD-10-CM

## 2023-10-30 LAB
ALBUMIN SERPL-MCNC: 4.5 G/DL (ref 3.5–5.2)
ALBUMIN/GLOB SERPL: 1.8 {RATIO} (ref 1–2.5)
ALP SERPL-CCNC: 66 U/L (ref 35–104)
ALT SERPL-CCNC: 26 U/L (ref 5–33)
ANION GAP SERPL CALCULATED.3IONS-SCNC: 9 MMOL/L (ref 9–17)
AST SERPL-CCNC: 33 U/L
BILIRUB SERPL-MCNC: 0.4 MG/DL (ref 0.3–1.2)
BUN SERPL-MCNC: 7 MG/DL (ref 8–23)
CALCIUM SERPL-MCNC: 9.7 MG/DL (ref 8.6–10.4)
CHLORIDE SERPL-SCNC: 104 MMOL/L (ref 98–107)
CHOLEST SERPL-MCNC: 223 MG/DL
CHOLESTEROL/HDL RATIO: 2.3
CO2 SERPL-SCNC: 26 MMOL/L (ref 20–31)
CREAT SERPL-MCNC: 0.6 MG/DL (ref 0.5–0.9)
ERYTHROCYTE [DISTWIDTH] IN BLOOD BY AUTOMATED COUNT: 13.4 % (ref 11.8–14.4)
GFR SERPL CREATININE-BSD FRML MDRD: >60 ML/MIN/1.73M2
GLUCOSE SERPL-MCNC: 97 MG/DL (ref 70–99)
HCT VFR BLD AUTO: 40.6 % (ref 36.3–47.1)
HDLC SERPL-MCNC: 96 MG/DL
HGB BLD-MCNC: 13.3 G/DL (ref 11.9–15.1)
LDLC SERPL CALC-MCNC: 116 MG/DL (ref 0–130)
MCH RBC QN AUTO: 31.5 PG (ref 25.2–33.5)
MCHC RBC AUTO-ENTMCNC: 32.8 G/DL (ref 28.4–34.8)
MCV RBC AUTO: 96.2 FL (ref 82.6–102.9)
NRBC BLD-RTO: 0 PER 100 WBC
PLATELET # BLD AUTO: 328 K/UL (ref 138–453)
PMV BLD AUTO: 10.3 FL (ref 8.1–13.5)
POTASSIUM SERPL-SCNC: 3.8 MMOL/L (ref 3.7–5.3)
PROT SERPL-MCNC: 7 G/DL (ref 6.4–8.3)
RBC # BLD AUTO: 4.22 M/UL (ref 3.95–5.11)
SODIUM SERPL-SCNC: 139 MMOL/L (ref 135–144)
TRIGL SERPL-MCNC: 56 MG/DL
WBC OTHER # BLD: 6 K/UL (ref 3.5–11.3)

## 2023-10-30 PROCEDURE — 99213 OFFICE O/P EST LOW 20 MIN: CPT | Performed by: NURSE PRACTITIONER

## 2023-10-30 ASSESSMENT — ENCOUNTER SYMPTOMS
DIARRHEA: 0
NAUSEA: 0
SHORTNESS OF BREATH: 0
SINUS PAIN: 0
VOMITING: 0
SORE THROAT: 0
COUGH: 0
ABDOMINAL PAIN: 0

## 2023-10-30 NOTE — PROGRESS NOTES
Visit Information    Have you changed or started any medications since your last visit including any over-the-counter medicines, vitamins, or herbal medicines? no   Have you stopped taking any of your medications? Is so, why? -  no  Are you having any side effects from any of your medications? - no    Have you seen any other physician or provider since your last visit?  no   Have you had any other diagnostic tests since your last visit?  no   Have you been seen in the emergency room and/or had an admission in a hospital since we last saw you?  no   Have you had your routine dental cleaning in the past 6 months?  no     Do you have an active MyChart account? If no, what is the barrier?   Yes    Patient Care Team:  CHILO Medina CNP as PCP - General (Certified Nurse Practitioner)  CHILO Medina CNP as PCP - Empaneled Provider    Medical History Review  Past Medical, Family, and Social History reviewed and  contribute to the patient presenting condition    Health Maintenance   Topic Date Due    Colorectal Cancer Screen  Never done    Shingles vaccine (1 of 2) Never done    COVID-19 Vaccine (2 - Booster for Mervin series) 06/07/2021    Cervical cancer screen  01/28/2023    Flu vaccine (1) Never done    Breast cancer screen  10/10/2023    Depression Monitoring  04/11/2024    DTaP/Tdap/Td vaccine (2 - Td or Tdap) 11/28/2026    Lipids  10/17/2027    Hepatitis C screen  Completed    HIV screen  Completed    Hepatitis A vaccine  Aged Out    Hepatitis B vaccine  Aged Out    Hib vaccine  Aged Out    Meningococcal (ACWY) vaccine  Aged Out    Pneumococcal 0-64 years Vaccine  Aged Out    Depression Screen  Discontinued
testing and treatments in a timely manner    Electronically signed by CHILO Umaña CNP on 10/30/2023at 12:13 PM

## 2023-11-14 DIAGNOSIS — F41.9 ANXIETY AND DEPRESSION: ICD-10-CM

## 2023-11-14 DIAGNOSIS — F32.A ANXIETY AND DEPRESSION: ICD-10-CM

## 2023-11-14 RX ORDER — SERTRALINE HYDROCHLORIDE 100 MG/1
100 TABLET, FILM COATED ORAL DAILY
Qty: 180 TABLET | Refills: 1 | Status: SHIPPED | OUTPATIENT
Start: 2023-11-14

## 2023-11-20 DIAGNOSIS — F32.A ANXIETY AND DEPRESSION: ICD-10-CM

## 2023-11-20 DIAGNOSIS — F41.9 ANXIETY AND DEPRESSION: ICD-10-CM

## 2023-11-20 RX ORDER — ZOLPIDEM TARTRATE 10 MG/1
10 TABLET ORAL NIGHTLY PRN
Qty: 30 TABLET | Refills: 1 | Status: SHIPPED | OUTPATIENT
Start: 2023-11-20 | End: 2024-01-19

## 2023-11-27 ENCOUNTER — HOSPITAL ENCOUNTER (OUTPATIENT)
Dept: VASCULAR LAB | Age: 63
Discharge: HOME | End: 2023-11-29
Payer: COMMERCIAL

## 2023-11-27 DIAGNOSIS — I83.893 VARICOSE VEINS OF BOTH LEGS WITH EDEMA: ICD-10-CM

## 2023-11-27 LAB
VAS LEFT CFV DIAM: 1 MM
VAS LEFT FV PROX DIAM: 0.6 MM
VAS LEFT POPLITEAL VEIN DIAM: 0.8 MM
VAS LEFT SSV PROX DIAM: 0.2 MM
VAS RIGHT CFV DIAM: 1.1 MM
VAS RIGHT FV PROX DIAM: 0.7 MM
VAS RIGHT POPLITEAL VEIN DIAM: 1 MM
VAS RIGHT SSV PROX DIAM: 0.3 MM

## 2023-11-27 PROCEDURE — 93970 EXTREMITY STUDY: CPT

## 2023-11-27 PROCEDURE — 93970 EXTREMITY STUDY: CPT | Performed by: SURGERY

## 2024-02-01 ENCOUNTER — TELEPHONE (OUTPATIENT)
Dept: FAMILY MEDICINE CLINIC | Age: 64
End: 2024-02-01

## 2024-02-01 NOTE — TELEPHONE ENCOUNTER
Patient called in stating she has not heard anything about her CT of the Lung results. I asked patient where she had it done at and she stated [facility]. I did not see any results scanned in; so I let patient know I would called the Radiology office at [facility] and will have them fax us the results and as soon as it is read and sent to us; we will call her with the results.

## 2024-02-12 LAB — MAMMOGRAPHY, EXTERNAL: NORMAL

## 2024-02-15 ENCOUNTER — OFFICE VISIT (OUTPATIENT)
Dept: FAMILY MEDICINE CLINIC | Age: 64
End: 2024-02-15
Payer: COMMERCIAL

## 2024-02-15 VITALS
WEIGHT: 130.2 LBS | HEIGHT: 64 IN | SYSTOLIC BLOOD PRESSURE: 108 MMHG | HEART RATE: 80 BPM | DIASTOLIC BLOOD PRESSURE: 68 MMHG | TEMPERATURE: 97.6 F | OXYGEN SATURATION: 98 % | BODY MASS INDEX: 22.23 KG/M2

## 2024-02-15 DIAGNOSIS — G89.29 CHRONIC BILATERAL LOW BACK PAIN WITH BILATERAL SCIATICA: Primary | ICD-10-CM

## 2024-02-15 DIAGNOSIS — M54.41 CHRONIC BILATERAL LOW BACK PAIN WITH BILATERAL SCIATICA: Primary | ICD-10-CM

## 2024-02-15 DIAGNOSIS — M54.42 CHRONIC BILATERAL LOW BACK PAIN WITH BILATERAL SCIATICA: Primary | ICD-10-CM

## 2024-02-15 DIAGNOSIS — R91.1 LUNG NODULE: ICD-10-CM

## 2024-02-15 DIAGNOSIS — Z12.11 COLON CANCER SCREENING: ICD-10-CM

## 2024-02-15 PROCEDURE — 99213 OFFICE O/P EST LOW 20 MIN: CPT | Performed by: NURSE PRACTITIONER

## 2024-02-15 RX ORDER — MELOXICAM 15 MG/1
15 TABLET ORAL DAILY
Qty: 30 TABLET | Refills: 1 | Status: SHIPPED | OUTPATIENT
Start: 2024-02-15

## 2024-02-15 RX ORDER — TIZANIDINE 2 MG/1
2 TABLET ORAL NIGHTLY PRN
Qty: 30 TABLET | Refills: 1 | Status: SHIPPED | OUTPATIENT
Start: 2024-02-15

## 2024-02-15 NOTE — PROGRESS NOTES
Visit Information    Have you changed or started any medications since your last visit including any over-the-counter medicines, vitamins, or herbal medicines? no   Have you stopped taking any of your medications? Is so, why? -  no  Are you having any side effects from any of your medications? - no    Have you seen any other physician or provider since your last visit?  no   Have you had any other diagnostic tests since your last visit?  no   Have you been seen in the emergency room and/or had an admission in a hospital since we last saw you?  no   Have you had your routine dental cleaning in the past 6 months?  no     Do you have an active MyChart account? If no, what is the barrier?  Yes    Patient Care Team:  Ron Lara APRN - CNP as PCP - General (Certified Nurse Practitioner)  Ron Lara APRN - CNP as PCP - Empaneled Provider    Medical History Review  Past Medical, Family, and Social History reviewed and  contribute to the patient presenting condition    Health Maintenance   Topic Date Due    Colorectal Cancer Screen  Never done    Shingles vaccine (1 of 2) Never done    Respiratory Syncytial Virus (RSV) Pregnant or age 60 yrs+ (1 - 1-dose 60+ series) Never done    Cervical cancer screen  01/28/2023    Flu vaccine (1) Never done    COVID-19 Vaccine (2 - 2023-24 season) 09/01/2023    Breast cancer screen  10/10/2023    Depression Monitoring  04/11/2024    DTaP/Tdap/Td vaccine (2 - Td or Tdap) 11/28/2026    Lipids  10/30/2028    Hepatitis C screen  Completed    HIV screen  Completed    Hepatitis A vaccine  Aged Out    Hepatitis B vaccine  Aged Out    Hib vaccine  Aged Out    Polio vaccine  Aged Out    Meningococcal (ACWY) vaccine  Aged Out    Pneumococcal 0-64 years Vaccine  Aged Out    Depression Screen  Discontinued

## 2024-02-15 NOTE — PROGRESS NOTES
[facility]  [facility]  [address] [facility] [address]  Dept: [phone]  Dept Fax: [phone]    Layla Hayden is a 63 y.o. female who presents today for her medicalconditions/complaints as noted below.  Layla Hayden is c/o of Lower Back Pain (Lower back pain goes into legs x's 7 days./No injury to the back.)      HPI:        63 y.o female presents with f/u     Chronic low back pain mild arthritis noted on xrays to low back and pelvis. No meds tried. No acute injury or trauma. No saddle anesthesia, no loss of bladder or bowel control. Will trial meloxicam in am, zanaflex in pm, trial PT     Ct chest showing pulmonary nodule, has had last ct Jan 2024, follows with Pulm Dr. Mckeon     History of insomnia treats with Ambien nightly, stable     History of anxiety and depression treats with Zoloft 100 mg, stable     History of overactive bladder treats with oxybutynin, stable     History of breast cancer takes Femara, follows with oncology, has seen oncology no adjustments needed. Last sugar negative Feb 2024          Past Medical History:   Diagnosis Date    Acute insomnia     Anxiety     Breast cancer ([facility]) 07/2020    right breast    Depression     MDRO (multiple drug resistant organisms) resistance 10/10/2018    E. Coli urine    Overactive bladder         Current Outpatient Medications   Medication Sig Dispense Refill    meloxicam (MOBIC) 15 MG tablet Take 1 tablet by mouth daily 30 tablet 1    tiZANidine (ZANAFLEX) 2 MG tablet Take 1 tablet by mouth nightly as needed (back pain) 30 tablet 1    zolpidem (AMBIEN) 10 MG tablet Take 1 tablet by mouth nightly as needed for Sleep for up to 60 days. Max Daily Amount: 10 mg 30 tablet 1    sertraline (ZOLOFT) 100 MG tablet Take 1 tablet by mouth daily 180 tablet 1    oxybutynin (DITROPAN-XL) 10 MG extended release tablet Take 1 tablet by mouth 2 times daily 180 tablet 1    pantoprazole (PROTONIX) 40 MG

## 2024-02-15 NOTE — PATIENT INSTRUCTIONS
exactly as directed.  If the doctor gave you a prescription medicine for pain, take it as prescribed.  If you are not taking a prescription pain medicine, ask your doctor if you can take an over-the-counter medicine.  Take short walks several times a day. You can start with 5 to 10 minutes, 3 or 4 times a day, and work up to longer walks. Walk on level surfaces and avoid hills and stairs until your back is better.  Return to work and other activities as soon as you can. Continued rest without activity is usually not good for your back.  To prevent future back pain, do exercises to stretch and strengthen your back and stomach. Learn how to use good posture, safe lifting techniques, and proper body mechanics.  When should you call for help?   Call your doctor now or seek immediate medical care if:    You have new or worsening numbness in your legs.     You have new or worsening weakness in your legs. (This could make it hard to stand up.)     You lose control of your bladder or bowels.   Watch closely for changes in your health, and be sure to contact your doctor if:    You have a fever, lose weight, or don't feel well.     You do not get better as expected.   Where can you learn more?  Go to https://www.Jaunt.net/patientEd and enter I594 to learn more about \"Back Pain: Care Instructions.\"  Current as of: July 18, 2023               Content Version: 13.9  © 8061-6804 [employer].   Care instructions adapted under license by [employer]. If you have questions about a medical condition or this instruction, always ask your healthcare professional. [employer] disclaims any warranty or liability for your use of this information.

## 2024-02-19 DIAGNOSIS — F41.9 ANXIETY AND DEPRESSION: ICD-10-CM

## 2024-02-19 DIAGNOSIS — F32.A ANXIETY AND DEPRESSION: ICD-10-CM

## 2024-02-19 RX ORDER — ZOLPIDEM TARTRATE 10 MG/1
10 TABLET ORAL NIGHTLY PRN
Qty: 30 TABLET | Refills: 1 | Status: SHIPPED | OUTPATIENT
Start: 2024-02-19 | End: 2024-04-19

## 2024-02-23 DIAGNOSIS — N32.81 OVERACTIVE BLADDER: ICD-10-CM

## 2024-02-23 RX ORDER — OXYBUTYNIN CHLORIDE 10 MG/1
10 TABLET, EXTENDED RELEASE ORAL 2 TIMES DAILY
Qty: 180 TABLET | Refills: 1 | Status: SHIPPED | OUTPATIENT
Start: 2024-02-23

## 2024-02-28 ENCOUNTER — TELEPHONE (OUTPATIENT)
Dept: FAMILY MEDICINE CLINIC | Age: 64
End: 2024-02-28

## 2024-02-28 DIAGNOSIS — Z12.11 COLON CANCER SCREENING: Primary | ICD-10-CM

## 2024-02-28 NOTE — TELEPHONE ENCOUNTER
Pts  called in to see if the pt was due for a colonoscopy     We do not have her last result on file so I called the [facility] [address] and they said she is due and they will fax the results, her last colonoscopy was done 7/2017    Called pt and advised her, she will schedule when the order is ready and she gets back from vacation.     Pt also stated that she got a colonguard sent to her and she does not use them so she does not want them sent anymore     Please advise

## 2024-02-29 ENCOUNTER — TELEPHONE (OUTPATIENT)
Dept: SURGERY | Age: 64
End: 2024-02-29

## 2024-02-29 NOTE — TELEPHONE ENCOUNTER
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 Confirmed office number with patient.

## 2024-03-12 DIAGNOSIS — M54.41 CHRONIC BILATERAL LOW BACK PAIN WITH BILATERAL SCIATICA: ICD-10-CM

## 2024-03-12 DIAGNOSIS — M54.42 CHRONIC BILATERAL LOW BACK PAIN WITH BILATERAL SCIATICA: ICD-10-CM

## 2024-03-12 DIAGNOSIS — G89.29 CHRONIC BILATERAL LOW BACK PAIN WITH BILATERAL SCIATICA: ICD-10-CM

## 2024-03-12 RX ORDER — TIZANIDINE 2 MG/1
2 TABLET ORAL NIGHTLY PRN
Qty: 90 TABLET | Refills: 1 | Status: SHIPPED | OUTPATIENT
Start: 2024-03-12

## 2024-04-16 DIAGNOSIS — M54.42 CHRONIC BILATERAL LOW BACK PAIN WITH BILATERAL SCIATICA: ICD-10-CM

## 2024-04-16 DIAGNOSIS — G89.29 CHRONIC BILATERAL LOW BACK PAIN WITH BILATERAL SCIATICA: ICD-10-CM

## 2024-04-16 DIAGNOSIS — M54.41 CHRONIC BILATERAL LOW BACK PAIN WITH BILATERAL SCIATICA: ICD-10-CM

## 2024-04-16 RX ORDER — MELOXICAM 15 MG/1
15 TABLET ORAL DAILY
Qty: 30 TABLET | Refills: 1 | Status: SHIPPED | OUTPATIENT
Start: 2024-04-16

## 2024-04-18 DIAGNOSIS — F32.A ANXIETY AND DEPRESSION: ICD-10-CM

## 2024-04-18 DIAGNOSIS — F41.9 ANXIETY AND DEPRESSION: ICD-10-CM

## 2024-04-18 RX ORDER — ZOLPIDEM TARTRATE 10 MG/1
10 TABLET ORAL NIGHTLY PRN
Qty: 30 TABLET | Refills: 1 | Status: SHIPPED | OUTPATIENT
Start: 2024-04-18 | End: 2024-06-17

## 2024-05-20 ENCOUNTER — TELEPHONE (OUTPATIENT)
Dept: FAMILY MEDICINE CLINIC | Age: 64
End: 2024-05-20

## 2024-05-20 DIAGNOSIS — F41.9 ANXIETY AND DEPRESSION: ICD-10-CM

## 2024-05-20 DIAGNOSIS — F32.A ANXIETY AND DEPRESSION: ICD-10-CM

## 2024-05-20 DIAGNOSIS — Z12.4 CERVICAL CANCER SCREENING: Primary | ICD-10-CM

## 2024-05-20 RX ORDER — ZOLPIDEM TARTRATE 10 MG/1
10 TABLET ORAL NIGHTLY PRN
Qty: 30 TABLET | Refills: 1 | Status: SHIPPED | OUTPATIENT
Start: 2024-05-20 | End: 2024-07-19

## 2024-05-20 NOTE — TELEPHONE ENCOUNTER
Patient calling asking for referral for obgyn, she is due for routine pap and also having lower abd cramping, she has med review appt with you 06/03, she doesn't have preference on referral just requesting a female and close to [place] (Is okay with going to [facility] in [place])

## 2024-06-03 ENCOUNTER — NURSE ONLY (OUTPATIENT)
Dept: PRIMARY CARE CLINIC | Age: 64
End: 2024-06-03

## 2024-06-03 ENCOUNTER — HOSPITAL ENCOUNTER (OUTPATIENT)
Age: 64
Setting detail: SPECIMEN
Discharge: HOME | End: 2024-06-03

## 2024-06-03 ENCOUNTER — OFFICE VISIT (OUTPATIENT)
Dept: FAMILY MEDICINE CLINIC | Age: 64
End: 2024-06-03
Payer: COMMERCIAL

## 2024-06-03 VITALS
SYSTOLIC BLOOD PRESSURE: 106 MMHG | TEMPERATURE: 97.6 F | HEIGHT: 64 IN | WEIGHT: 126.2 LBS | OXYGEN SATURATION: 98 % | DIASTOLIC BLOOD PRESSURE: 62 MMHG | BODY MASS INDEX: 21.54 KG/M2 | HEART RATE: 72 BPM

## 2024-06-03 DIAGNOSIS — M79.644 THUMB PAIN, RIGHT: Primary | ICD-10-CM

## 2024-06-03 DIAGNOSIS — R91.1 PULMONARY NODULE: ICD-10-CM

## 2024-06-03 DIAGNOSIS — M79.644 THUMB PAIN, RIGHT: ICD-10-CM

## 2024-06-03 LAB
ERYTHROCYTE [SEDIMENTATION RATE] IN BLOOD BY PHOTOMETRIC METHOD: 16 MM/HR (ref 0–30)
RHEUMATOID FACT SER NEPH-ACNC: <10 IU/ML (ref 0–13)
URATE SERPL-MCNC: 3 MG/DL (ref 2.4–5.7)

## 2024-06-03 PROCEDURE — 1123F ACP DISCUSS/DSCN MKR DOCD: CPT | Performed by: NURSE PRACTITIONER

## 2024-06-03 PROCEDURE — 99214 OFFICE O/P EST MOD 30 MIN: CPT | Performed by: NURSE PRACTITIONER

## 2024-06-03 SDOH — ECONOMIC STABILITY: HOUSING INSECURITY
IN THE LAST 12 MONTHS, WAS THERE A TIME WHEN YOU DID NOT HAVE A STEADY PLACE TO SLEEP OR SLEPT IN A SHELTER (INCLUDING NOW)?: NO

## 2024-06-03 SDOH — ECONOMIC STABILITY: FOOD INSECURITY: WITHIN THE PAST 12 MONTHS, YOU WORRIED THAT YOUR FOOD WOULD RUN OUT BEFORE YOU GOT MONEY TO BUY MORE.: NEVER TRUE

## 2024-06-03 SDOH — ECONOMIC STABILITY: FOOD INSECURITY: WITHIN THE PAST 12 MONTHS, THE FOOD YOU BOUGHT JUST DIDN'T LAST AND YOU DIDN'T HAVE MONEY TO GET MORE.: NEVER TRUE

## 2024-06-03 SDOH — ECONOMIC STABILITY: INCOME INSECURITY: HOW HARD IS IT FOR YOU TO PAY FOR THE VERY BASICS LIKE FOOD, HOUSING, MEDICAL CARE, AND HEATING?: NOT HARD AT ALL

## 2024-06-03 ASSESSMENT — ENCOUNTER SYMPTOMS
VOMITING: 0
DIARRHEA: 0
COUGH: 0
SORE THROAT: 0
SHORTNESS OF BREATH: 0
ABDOMINAL PAIN: 0
NAUSEA: 0
SINUS PAIN: 0

## 2024-06-03 NOTE — PROGRESS NOTES
[facility]  [facility]  [address] [address]  Dept: [phone]  Dept Fax: [phone]    Layla Hayden is a 64 y.o. female who presents today for her medicalconditions/complaints as noted below.  Layla Hayden is c/o of Swelling (Right thumb swelling pain going up the arm. )      HPI:     64 y.o female presents with f/u    Right thumb pain, no injury or truama, no arm swelling. Has noticed some  strength weakness and pain when squeezing.      Chronic low back pain mild arthritis noted on xrays to low back and pelvis. No meds tried. No acute injury or trauma. No saddle anesthesia, no loss of bladder or bowel control. Us oe meloxicam and zanaflex prn     Ct chest showing pulmonary nodule, due for monitoring. Has seen pulm Dr. Mckeon     History of insomnia treats with Ambien nightly, stable     History of anxiety and depression treats with Zoloft 100 mg, stable     History of overactive bladder treats with oxybutynin, stable     History of breast cancer takes Femara, follows with oncology, has seen oncology no adjustments needed. Last sugar negative Feb 2024             Past Medical History:   Diagnosis Date    Acute insomnia     Anxiety     Breast cancer (HCC) 07/2020    right breast    Depression     MDRO (multiple drug resistant organisms) resistance 10/10/2018    E. Coli urine    Overactive bladder         Current Outpatient Medications   Medication Sig Dispense Refill    zolpidem (AMBIEN) 10 MG tablet Take 1 tablet by mouth nightly as needed for Sleep for up to 60 days. Max Daily Amount: 10 mg 30 tablet 1    meloxicam (MOBIC) 15 MG tablet Take 1 tablet by mouth daily 30 tablet 1    tiZANidine (ZANAFLEX) 2 MG tablet Take 1 tablet by mouth nightly as needed (back pain) 90 tablet 1    oxyBUTYnin (DITROPAN-XL) 10 MG extended release tablet Take 1 tablet by mouth 2 times daily 180 tablet 1    sertraline (ZOLOFT) 100 MG tablet Take 1 tablet

## 2024-06-03 NOTE — PROGRESS NOTES
Visit Information    Have you changed or started any medications since your last visit including any over-the-counter medicines, vitamins, or herbal medicines? no   Have you stopped taking any of your medications? Is so, why? -  no  Are you having any side effects from any of your medications? - no    Have you seen any other physician or provider since your last visit?  no   Have you had any other diagnostic tests since your last visit?  no   Have you been seen in the emergency room and/or had an admission in a hospital since we last saw you?  no   Have you had your routine dental cleaning in the past 6 months?  no     Do you have an active MyChart account? If no, what is the barrier?  Yes    Patient Care Team:  Ron Lara APRN - CNP as PCP - General (Certified Nurse Practitioner)  Ron Lara APRN - CNP as PCP - Empaneled Provider    Medical History Review  Past Medical, Family, and Social History reviewed and  contribute to the patient presenting condition    Health Maintenance   Topic Date Due    Shingles vaccine (1 of 2) Never done    Respiratory Syncytial Virus (RSV) Pregnant or age 60 yrs+ (1 - 1-dose 60+ series) Never done    Cervical cancer screen  01/28/2023    COVID-19 Vaccine (2 - 2023-24 season) 09/01/2023    Pneumococcal 65+ years Vaccine (1 of 1 - PCV) Never done    Flu vaccine (Season Ended) 08/01/2024    Breast cancer screen  02/12/2025    Depression Monitoring  02/15/2025    DTaP/Tdap/Td vaccine (2 - Td or Tdap) 11/28/2026    Colorectal Cancer Screen  07/28/2027    Lipids  10/30/2028    DEXA (modify frequency per FRAX score)  Completed    Hepatitis C screen  Completed    HIV screen  Completed    Hepatitis A vaccine  Aged Out    Hepatitis B vaccine  Aged Out    Hib vaccine  Aged Out    Polio vaccine  Aged Out    Meningococcal (ACWY) vaccine  Aged Out    Depression Screen  Discontinued

## 2024-06-04 LAB
ANA SER QL IA: NEGATIVE
DSDNA IGG SER QL IA: 1.5 IU/ML
NUCLEAR IGG SER IA-RTO: 0.2 U/ML

## 2024-06-06 DIAGNOSIS — M79.644 THUMB PAIN, RIGHT: Primary | ICD-10-CM

## 2024-06-21 DIAGNOSIS — F41.9 ANXIETY AND DEPRESSION: ICD-10-CM

## 2024-06-21 DIAGNOSIS — F32.A ANXIETY AND DEPRESSION: ICD-10-CM

## 2024-06-21 RX ORDER — ZOLPIDEM TARTRATE 10 MG/1
10 TABLET ORAL NIGHTLY PRN
Qty: 30 TABLET | Refills: 1 | Status: SHIPPED | OUTPATIENT
Start: 2024-06-21 | End: 2024-08-20

## 2024-07-01 ENCOUNTER — OFFICE VISIT (OUTPATIENT)
Dept: ORTHOPEDIC SURGERY | Age: 64
End: 2024-07-01
Payer: COMMERCIAL

## 2024-07-01 VITALS — BODY MASS INDEX: 21.68 KG/M2 | WEIGHT: 127 LBS | HEIGHT: 64 IN | RESPIRATION RATE: 14 BRPM

## 2024-07-01 DIAGNOSIS — M79.644 CHRONIC PAIN OF RIGHT THUMB: Primary | ICD-10-CM

## 2024-07-01 DIAGNOSIS — M65.331 TRIGGER MIDDLE FINGER OF RIGHT HAND: ICD-10-CM

## 2024-07-01 DIAGNOSIS — G89.29 CHRONIC PAIN OF RIGHT THUMB: Primary | ICD-10-CM

## 2024-07-01 PROCEDURE — 1123F ACP DISCUSS/DSCN MKR DOCD: CPT

## 2024-07-01 PROCEDURE — 99203 OFFICE O/P NEW LOW 30 MIN: CPT

## 2024-07-01 ASSESSMENT — ENCOUNTER SYMPTOMS
NAUSEA: 0
COLOR CHANGE: 0
VOMITING: 0

## 2024-07-01 NOTE — PROGRESS NOTES
[facility]  [address]  [place] [id number]  Dept: [phone]    Ambulatory Orthopedic New Patient Visit      CHIEF COMPLAINT:    Chief Complaint   Patient presents with    Hand Pain     Right thumb and middle finger       HISTORY OF PRESENT ILLNESS:      Date of Injury: no known injuries.     The patient is a right hand dominant 64 y.o. female who is being seen  for consultation and evaluation of her right thumb pain. X-rays of the right hand were taken on 6/6/24 and these demonstrate mild thumb CMC joint arthritis.  She has had this right thumb pain for approximately 4 months now.  She denies any known injuries.  She works at the [employer] and does a lot of typing as well as repetitive activities with the right hand.  She states that this is often bothersome.  She does feel like the thumb has been slightly swollen.  She denies any redness or warmth.  She takes meloxicam chronically for pain.  She also states that she has noticed locking of the right middle finger for the past 3 months.  She states that this has become very bothersome while she is working.  She has had no treatments for the right hand.  She denies any numbness or paresthesias.      Past Medical History:    Past Medical History:   Diagnosis Date    Acute insomnia     Anxiety     Breast cancer (HCC) 07/2020    right breast    Depression     MDRO (multiple drug resistant organisms) resistance 10/10/2018    E. Coli urine    Overactive bladder        Past Surgical History:    Past Surgical History:   Procedure Laterality Date    BREAST RECONSTRUCTION Right     MASTECTOMY Right 11/2019       Current Medications:   Current Outpatient Medications   Medication Sig Dispense Refill    diclofenac sodium (VOLTAREN) 1 % GEL Apply 4 g topically 4 times daily 150 g 2    zolpidem (AMBIEN) 10 MG tablet Take 1 tablet by mouth nightly as needed for Sleep

## 2024-07-15 ENCOUNTER — HOSPITAL ENCOUNTER (OUTPATIENT)
Dept: CT IMAGING | Age: 64
Discharge: HOME | End: 2024-07-17
Payer: COMMERCIAL

## 2024-07-15 ENCOUNTER — OFFICE VISIT (OUTPATIENT)
Dept: FAMILY MEDICINE CLINIC | Age: 64
End: 2024-07-15
Payer: COMMERCIAL

## 2024-07-15 VITALS
OXYGEN SATURATION: 98 % | DIASTOLIC BLOOD PRESSURE: 60 MMHG | WEIGHT: 126.2 LBS | TEMPERATURE: 97.5 F | HEIGHT: 64 IN | SYSTOLIC BLOOD PRESSURE: 98 MMHG | BODY MASS INDEX: 21.54 KG/M2 | HEART RATE: 88 BPM

## 2024-07-15 DIAGNOSIS — Z00.00 WELCOME TO MEDICARE PREVENTIVE VISIT: Primary | ICD-10-CM

## 2024-07-15 DIAGNOSIS — R91.1 PULMONARY NODULE: ICD-10-CM

## 2024-07-15 DIAGNOSIS — M79.89 LEFT LEG SWELLING: ICD-10-CM

## 2024-07-15 PROCEDURE — G0402 INITIAL PREVENTIVE EXAM: HCPCS | Performed by: NURSE PRACTITIONER

## 2024-07-15 PROCEDURE — 71250 CT THORAX DX C-: CPT

## 2024-07-15 PROCEDURE — 1123F ACP DISCUSS/DSCN MKR DOCD: CPT | Performed by: NURSE PRACTITIONER

## 2024-07-15 ASSESSMENT — PATIENT HEALTH QUESTIONNAIRE - PHQ9
1. LITTLE INTEREST OR PLEASURE IN DOING THINGS: NOT AT ALL
7. TROUBLE CONCENTRATING ON THINGS, SUCH AS READING THE NEWSPAPER OR WATCHING TELEVISION: NOT AT ALL
5. POOR APPETITE OR OVEREATING: NOT AT ALL
8. MOVING OR SPEAKING SO SLOWLY THAT OTHER PEOPLE COULD HAVE NOTICED. OR THE OPPOSITE, BEING SO FIGETY OR RESTLESS THAT YOU HAVE BEEN MOVING AROUND A LOT MORE THAN USUAL: NOT AT ALL
3. TROUBLE FALLING OR STAYING ASLEEP: NEARLY EVERY DAY
4. FEELING TIRED OR HAVING LITTLE ENERGY: MORE THAN HALF THE DAYS
9. THOUGHTS THAT YOU WOULD BE BETTER OFF DEAD, OR OF HURTING YOURSELF: NOT AT ALL
10. IF YOU CHECKED OFF ANY PROBLEMS, HOW DIFFICULT HAVE THESE PROBLEMS MADE IT FOR YOU TO DO YOUR WORK, TAKE CARE OF THINGS AT HOME, OR GET ALONG WITH OTHER PEOPLE: NOT DIFFICULT AT ALL
SUM OF ALL RESPONSES TO PHQ QUESTIONS 1-9: 5
SUM OF ALL RESPONSES TO PHQ QUESTIONS 1-9: 5
2. FEELING DOWN, DEPRESSED OR HOPELESS: NOT AT ALL
SUM OF ALL RESPONSES TO PHQ QUESTIONS 1-9: 5
6. FEELING BAD ABOUT YOURSELF - OR THAT YOU ARE A FAILURE OR HAVE LET YOURSELF OR YOUR FAMILY DOWN: NOT AT ALL
SUM OF ALL RESPONSES TO PHQ QUESTIONS 1-9: 5
SUM OF ALL RESPONSES TO PHQ9 QUESTIONS 1 & 2: 0

## 2024-07-15 ASSESSMENT — LIFESTYLE VARIABLES
HOW MANY STANDARD DRINKS CONTAINING ALCOHOL DO YOU HAVE ON A TYPICAL DAY: 3 OR 4
HOW OFTEN DO YOU HAVE A DRINK CONTAINING ALCOHOL: 2-3 TIMES A WEEK
HOW OFTEN DURING THE LAST YEAR HAVE YOU BEEN UNABLE TO REMEMBER WHAT HAPPENED THE NIGHT BEFORE BECAUSE YOU HAD BEEN DRINKING: NEVER
HOW OFTEN DURING THE LAST YEAR HAVE YOU NEEDED AN ALCOHOLIC DRINK FIRST THING IN THE MORNING TO GET YOURSELF GOING AFTER A NIGHT OF HEAVY DRINKING: NEVER
HOW OFTEN DURING THE LAST YEAR HAVE YOU FOUND THAT YOU WERE NOT ABLE TO STOP DRINKING ONCE YOU HAD STARTED: NEVER
HOW OFTEN DURING THE LAST YEAR HAVE YOU FAILED TO DO WHAT WAS NORMALLY EXPECTED FROM YOU BECAUSE OF DRINKING: NEVER
HAS A RELATIVE, FRIEND, DOCTOR, OR ANOTHER HEALTH PROFESSIONAL EXPRESSED CONCERN ABOUT YOUR DRINKING OR SUGGESTED YOU CUT DOWN: NO
HOW OFTEN DURING THE LAST YEAR HAVE YOU HAD A FEELING OF GUILT OR REMORSE AFTER DRINKING: NEVER
HAVE YOU OR SOMEONE ELSE BEEN INJURED AS A RESULT OF YOUR DRINKING: NO

## 2024-07-15 NOTE — PROGRESS NOTES
Visit Information    Have you changed or started any medications since your last visit including any over-the-counter medicines, vitamins, or herbal medicines? no   Have you stopped taking any of your medications? Is so, why? -  no  Are you having any side effects from any of your medications? - no    Have you seen any other physician or provider since your last visit?  no   Have you had any other diagnostic tests since your last visit?  no   Have you been seen in the emergency room and/or had an admission in a hospital since we last saw you?  no   Have you had your routine dental cleaning in the past 6 months?  no     Do you have an active MyChart account? If no, what is the barrier?  Yes    Patient Care Team:  Ron Lara APRN - CNP as PCP - General (Certified Nurse Practitioner)  Ron Lara APRN - CNP as PCP - Empaneled Provider    Medical History Review  Past Medical, Family, and Social History reviewed and  contribute to the patient presenting condition    Health Maintenance   Topic Date Due    Shingles vaccine (1 of 2) Never done    Respiratory Syncytial Virus (RSV) Pregnant or age 60 yrs+ (1 - 1-dose 60+ series) Never done    Cervical cancer screen  01/28/2023    COVID-19 Vaccine (2 - 2023-24 season) 09/01/2023    Annual Wellness Visit (Medicare Advantage)  Never done    Pneumococcal 65+ years Vaccine (1 of 1 - PCV) Never done    Flu vaccine (1) 08/01/2024    Breast cancer screen  02/12/2025    Depression Monitoring  02/15/2025    DTaP/Tdap/Td vaccine (2 - Td or Tdap) 11/28/2026    Colorectal Cancer Screen  07/28/2027    Lipids  10/30/2028    DEXA (modify frequency per FRAX score)  Completed    Hepatitis C screen  Completed    HIV screen  Completed    Hepatitis A vaccine  Aged Out    Hepatitis B vaccine  Aged Out    Hib vaccine  Aged Out    Polio vaccine  Aged Out    Meningococcal (ACWY) vaccine  Aged Out    Depression Screen  Discontinued

## 2024-07-15 NOTE — PROGRESS NOTES
Medicare Annual Wellness Visit    Layla Hayden is here for Swelling (Left leg swelling comes and goes x's 1 month ) and Medicare AWV    Assessment & Plan   Welcome to Medicare preventive visit  Recommendations for Preventive Services Due: see orders and patient instructions/AVS.  Recommended screening schedule for the next 5-10 years is provided to the patient in written form: see Patient Instructions/AVS.     No follow-ups on file.     Subjective       64 y.o female presents with f/u    Left leg swelling, mild pain, completed reflux doppler negative for dvt, will refer to vascular, has already tried compression stocking.        No evidence of deep vein thrombosis in the right lower extremity. Vessels demonstrate normal compressibility, color filling, and phasic and spontaneous flow.    No evidence of deep vein thrombosis in the left lower extremity. Vessels demonstrate normal compressibility, color filling, and phasic and spontaneous flow.       Patient's complete Health Risk Assessment and screening values have been reviewed and are found in Flowsheets. The following problems were reviewed today and where indicated follow up appointments were made and/or referrals ordered.    Positive Risk Factor Screenings with Interventions:       Cognitive:   Clock Drawing Test (CDT): (!) Abnormal  Words recalled: 3 Words Recalled  Total Score: 3  Total Score Interpretation: Normal Mini-Cog  Interventions:  Patient comments: not an artist    Depression:  PHQ-2 Score: 0  PHQ-9 Total Score: 5  Total Score Interpretation: 5-9 = mild depression  Interventions:  Patient comments: \"I'm fine\"  Patient declines any further evaluation or treatment           Inactivity:  On average, how many days per week do you engage in moderate to strenuous exercise (like a brisk walk)?: 0 days (!) Abnormal  On average, how many minutes do you engage in exercise at this level?: 0 min  Interventions - Inactivity:  Recommendations: patient agrees to

## 2024-07-19 DIAGNOSIS — R07.9 CHEST PAIN, UNSPECIFIED TYPE: Primary | ICD-10-CM

## 2024-08-05 ENCOUNTER — HOSPITAL ENCOUNTER (OUTPATIENT)
Dept: NUCLEAR MEDICINE | Age: 64
Discharge: HOME | End: 2024-08-07
Payer: COMMERCIAL

## 2024-08-05 ENCOUNTER — HOSPITAL ENCOUNTER (OUTPATIENT)
Age: 64
Discharge: HOME | End: 2024-08-07
Payer: COMMERCIAL

## 2024-08-05 VITALS
BODY MASS INDEX: 21.66 KG/M2 | RESPIRATION RATE: 18 BRPM | HEIGHT: 64 IN | SYSTOLIC BLOOD PRESSURE: 121 MMHG | DIASTOLIC BLOOD PRESSURE: 86 MMHG | HEART RATE: 81 BPM

## 2024-08-05 DIAGNOSIS — R07.9 CHEST PAIN, UNSPECIFIED TYPE: ICD-10-CM

## 2024-08-05 LAB
STRESS ANGINA INDEX: 0
STRESS BASELINE DIAS BP: 61 MMHG
STRESS BASELINE SYS BP: 111 MMHG
STRESS ESTIMATED WORKLOAD: 1.2 METS
STRESS EXERCISE DUR MIN: 9 MIN
STRESS EXERCISE DUR SEC: 15 SEC
STRESS PEAK DIAS BP: 66 MMHG
STRESS PEAK SYS BP: 133 MMHG
STRESS PERCENT HR ACHIEVED: 93 %
STRESS POST PEAK HR: 144 BPM
STRESS RATE PRESSURE PRODUCT: NORMAL BPM*MMHG
STRESS TARGET HR: 155 BPM

## 2024-08-05 PROCEDURE — 78452 HT MUSCLE IMAGE SPECT MULT: CPT

## 2024-08-05 PROCEDURE — A9500 TC99M SESTAMIBI: HCPCS | Performed by: NURSE PRACTITIONER

## 2024-08-05 PROCEDURE — 93017 CV STRESS TEST TRACING ONLY: CPT

## 2024-08-05 PROCEDURE — 2580000003 HC RX 258: Performed by: NURSE PRACTITIONER

## 2024-08-05 PROCEDURE — 3430000000 HC RX DIAGNOSTIC RADIOPHARMACEUTICAL: Performed by: NURSE PRACTITIONER

## 2024-08-05 RX ORDER — TETRAKIS(2-METHOXYISOBUTYLISOCYANIDE)COPPER(I) TETRAFLUOROBORATE 1 MG/ML
43.9 INJECTION, POWDER, LYOPHILIZED, FOR SOLUTION INTRAVENOUS
Status: COMPLETED | OUTPATIENT
Start: 2024-08-05 | End: 2024-08-05

## 2024-08-05 RX ORDER — TETRAKIS(2-METHOXYISOBUTYLISOCYANIDE)COPPER(I) TETRAFLUOROBORATE 1 MG/ML
16 INJECTION, POWDER, LYOPHILIZED, FOR SOLUTION INTRAVENOUS
Status: COMPLETED | OUTPATIENT
Start: 2024-08-05 | End: 2024-08-05

## 2024-08-05 RX ORDER — METOPROLOL TARTRATE 1 MG/ML
5 INJECTION, SOLUTION INTRAVENOUS EVERY 5 MIN PRN
Status: ACTIVE | OUTPATIENT
Start: 2024-08-05 | End: 2024-08-05

## 2024-08-05 RX ORDER — SODIUM CHLORIDE 9 MG/ML
500 INJECTION, SOLUTION INTRAVENOUS CONTINUOUS PRN
Status: ACTIVE | OUTPATIENT
Start: 2024-08-05 | End: 2024-08-05

## 2024-08-05 RX ORDER — SODIUM CHLORIDE 0.9 % (FLUSH) 0.9 %
5-40 SYRINGE (ML) INJECTION PRN
Status: ACTIVE | OUTPATIENT
Start: 2024-08-05 | End: 2024-08-05

## 2024-08-05 RX ORDER — ATROPINE SULFATE 0.1 MG/ML
0.5 INJECTION INTRAVENOUS EVERY 5 MIN PRN
Status: ACTIVE | OUTPATIENT
Start: 2024-08-05 | End: 2024-08-05

## 2024-08-05 RX ORDER — NITROGLYCERIN 0.4 MG/1
0.4 TABLET SUBLINGUAL EVERY 5 MIN PRN
Status: ACTIVE | OUTPATIENT
Start: 2024-08-05 | End: 2024-08-05

## 2024-08-05 RX ADMIN — Medication 16 MILLICURIE: at 07:12

## 2024-08-05 RX ADMIN — Medication 43.9 MILLICURIE: at 08:37

## 2024-08-05 RX ADMIN — SODIUM CHLORIDE 500 ML: 9 INJECTION, SOLUTION INTRAVENOUS at 08:12

## 2024-08-12 ENCOUNTER — OFFICE VISIT (OUTPATIENT)
Dept: OBGYN CLINIC | Age: 64
End: 2024-08-12
Payer: COMMERCIAL

## 2024-08-12 VITALS
DIASTOLIC BLOOD PRESSURE: 63 MMHG | BODY MASS INDEX: 21.95 KG/M2 | WEIGHT: 128.6 LBS | HEIGHT: 64 IN | SYSTOLIC BLOOD PRESSURE: 92 MMHG

## 2024-08-12 DIAGNOSIS — R10.2 PELVIC PAIN: ICD-10-CM

## 2024-08-12 DIAGNOSIS — Z12.31 ENCOUNTER FOR SCREENING MAMMOGRAM FOR BREAST CANCER: ICD-10-CM

## 2024-08-12 DIAGNOSIS — Z85.3 HISTORY OF RIGHT BREAST CANCER: ICD-10-CM

## 2024-08-12 DIAGNOSIS — Z01.419 ENCOUNTER FOR GYNECOLOGICAL EXAMINATION: Primary | ICD-10-CM

## 2024-08-12 LAB — PAP SMEAR: NORMAL

## 2024-08-12 PROCEDURE — G0101 CA SCREEN;PELVIC/BREAST EXAM: HCPCS | Performed by: NURSE PRACTITIONER

## 2024-08-12 ASSESSMENT — ENCOUNTER SYMPTOMS
COUGH: 0
BACK PAIN: 0
SHORTNESS OF BREATH: 0
RESPIRATORY NEGATIVE: 1
CONSTIPATION: 1
ABDOMINAL DISTENTION: 0
ALLERGIC/IMMUNOLOGIC NEGATIVE: 1

## 2024-08-12 NOTE — PROGRESS NOTES
Chaperone for Intimate Exam  Chaperone was offered as part of the rooming process. Patient declined and agrees to continue with exam without a chaperone.  Chaperone: NONE       
  Neurological:      Mental Status: She is alert.   Psychiatric:         Mood and Affect: Mood normal.              ASSESSMENT/PLAN    64 y.o. Female;  Annual   Diagnosis Orders   1. Encounter for gynecological examination  PAP Smear      2. Encounter for screening mammogram for breast cancer  VAUGHN DIGITAL SCREEN UNILATERAL LEFT      3. Pelvic pain  US NON OB TRANSVAGINAL    US PELVIS COMPLETE      4. History of right breast cancer  MRI BREAST BILATERAL W WO CONTRAST          1. Encounter for gynecological examination  -     PAP Smear; Future  2. Encounter for screening mammogram for breast cancer  -     VAUGHN DIGITAL SCREEN UNILATERAL LEFT; Future  3. Pelvic pain  -     US NON OB TRANSVAGINAL; Future  -     US PELVIS COMPLETE; Future  4. History of right breast cancer  -     MRI BREAST BILATERAL W WO CONTRAST; Future              Hereditary Breast, Ovarian,Colon and Uterine Cancer screening Done.          Tobacco & Secondary smoke risks reviewed; instructed oncessation and avoidance    - if ultrasound is normal will have her follow up with pcp for possible GI origin  - Pap collected per ASCCP guidelines.  -Menopause symptoms discussed   - Screening mammogram discussed and advised yearly if normal starting at age 40.  - Calcium and Vitamin D dosing reviewed.  - Colonoscopy screening reviewed.   -General diet and exercise reviewed.   - Routine health maintenance per patients PCP.    Return in about 1 year (around 8/12/2025) for annual exam.    Electronically signed by CHILO Glaser CNP on 8/12/2024at 9:51 AM

## 2024-08-13 LAB
STRESS ANGINA INDEX: 0
STRESS BASELINE DIAS BP: 61 MMHG
STRESS BASELINE HR: 68 BPM
STRESS BASELINE SYS BP: 111 MMHG
STRESS ESTIMATED WORKLOAD: 1.2 METS
STRESS EXERCISE DUR MIN: 9 MIN
STRESS EXERCISE DUR SEC: 15 SEC
STRESS PEAK DIAS BP: 66 MMHG
STRESS PEAK SYS BP: 133 MMHG
STRESS PERCENT HR ACHIEVED: 93 %
STRESS POST PEAK HR: 144 BPM
STRESS RATE PRESSURE PRODUCT: NORMAL BPM*MMHG
STRESS TARGET HR: 155 BPM

## 2024-08-19 DIAGNOSIS — F41.9 ANXIETY AND DEPRESSION: ICD-10-CM

## 2024-08-19 DIAGNOSIS — Z12.11 COLON CANCER SCREENING: Primary | ICD-10-CM

## 2024-08-19 DIAGNOSIS — F32.A ANXIETY AND DEPRESSION: ICD-10-CM

## 2024-08-19 RX ORDER — ZOLPIDEM TARTRATE 10 MG/1
10 TABLET ORAL NIGHTLY PRN
Qty: 30 TABLET | Refills: 1 | Status: SHIPPED | OUTPATIENT
Start: 2024-08-19 | End: 2024-10-18

## 2024-08-19 NOTE — TELEPHONE ENCOUNTER
Patient called the office and would like an order for a colonoscopy to be done at the [facility]     Patient also needs a refill on her Ambien, medication pending.

## 2024-08-20 DIAGNOSIS — N32.81 OVERACTIVE BLADDER: ICD-10-CM

## 2024-08-20 RX ORDER — OXYBUTYNIN CHLORIDE 10 MG/1
10 TABLET, EXTENDED RELEASE ORAL 2 TIMES DAILY
Qty: 180 TABLET | Refills: 1 | Status: SHIPPED | OUTPATIENT
Start: 2024-08-20

## 2024-08-20 NOTE — TELEPHONE ENCOUNTER
Pharmacy called in patient needs to refill at [facility] now / [facility] needs a new script sent over to fill     Thank you    Santa

## 2024-08-22 ENCOUNTER — TELEPHONE (OUTPATIENT)
Dept: FAMILY MEDICINE CLINIC | Age: 64
End: 2024-08-22

## 2024-08-22 DIAGNOSIS — F41.8 TEST ANXIETY: Primary | ICD-10-CM

## 2024-08-22 RX ORDER — ALPRAZOLAM 0.5 MG/1
0.5 TABLET ORAL
Qty: 1 TABLET | Refills: 0 | Status: SHIPPED | OUTPATIENT
Start: 2024-08-22 | End: 2024-08-22

## 2024-08-22 NOTE — TELEPHONE ENCOUNTER
Patient called in Mri is ordered , patient is claustrophobic and would like medication to help her through the MRI     Please advise.      Patient is also asking if you can put the order in for a colonoscopy .     Thank you    Santa

## 2024-08-26 ENCOUNTER — TELEPHONE (OUTPATIENT)
Dept: GASTROENTEROLOGY | Age: 64
End: 2024-08-26

## 2024-08-26 DIAGNOSIS — Z01.419 ENCOUNTER FOR GYNECOLOGICAL EXAMINATION: ICD-10-CM

## 2024-08-26 NOTE — TELEPHONE ENCOUNTER
Procedure scheduled/Hamdani  Procedure: colonoscopy  Dx: screening  Ordering:Ron borden CNP  Date: 10/14/24  Time: 9:30am/arrive 8:00am  Hospital: [facility]  Bowel prep instructions sent to patient: Suprep  Patient advised by phone/mail; Phone/mailed bowel prep instructions

## 2024-08-28 RX ORDER — SODIUM, POTASSIUM,MAG SULFATES 17.5-3.13G
1 SOLUTION, RECONSTITUTED, ORAL ORAL ONCE
Qty: 1 EACH | Refills: 0 | Status: SHIPPED | OUTPATIENT
Start: 2024-08-28 | End: 2024-08-28

## 2024-09-16 ENCOUNTER — HOSPITAL ENCOUNTER (OUTPATIENT)
Dept: ULTRASOUND IMAGING | Age: 64
Discharge: HOME | End: 2024-09-18
Payer: COMMERCIAL

## 2024-09-16 ENCOUNTER — HOSPITAL ENCOUNTER (OUTPATIENT)
Dept: MRI IMAGING | Age: 64
Discharge: HOME | End: 2024-09-18
Payer: COMMERCIAL

## 2024-09-16 DIAGNOSIS — R10.2 PELVIC PAIN: ICD-10-CM

## 2024-09-16 DIAGNOSIS — Z85.3 HISTORY OF RIGHT BREAST CANCER: ICD-10-CM

## 2024-09-16 LAB
CREAT SERPL-MCNC: 0.7 MG/DL (ref 0.5–0.9)
GFR, ESTIMATED: >90 ML/MIN/1.73M2

## 2024-09-16 PROCEDURE — 76830 TRANSVAGINAL US NON-OB: CPT

## 2024-09-16 PROCEDURE — 36415 COLL VENOUS BLD VENIPUNCTURE: CPT

## 2024-09-16 PROCEDURE — C8908 MRI W/O FOL W/CONT, BREAST,: HCPCS

## 2024-09-16 PROCEDURE — 82565 ASSAY OF CREATININE: CPT

## 2024-09-16 PROCEDURE — 76856 US EXAM PELVIC COMPLETE: CPT

## 2024-09-16 PROCEDURE — A9579 GAD-BASE MR CONTRAST NOS,1ML: HCPCS | Performed by: NURSE PRACTITIONER

## 2024-09-16 PROCEDURE — 6360000004 HC RX CONTRAST MEDICATION: Performed by: NURSE PRACTITIONER

## 2024-09-16 RX ADMIN — GADOTERIDOL 13 ML: 279.3 INJECTION, SOLUTION INTRAVENOUS at 10:29

## 2024-09-23 ENCOUNTER — TELEPHONE (OUTPATIENT)
Dept: OBGYN CLINIC | Age: 64
End: 2024-09-23

## 2024-09-23 DIAGNOSIS — R10.2 PELVIC PAIN: Primary | ICD-10-CM

## 2024-09-23 DIAGNOSIS — R93.89 ABNORMAL ULTRASOUND: ICD-10-CM

## 2024-09-24 ENCOUNTER — TELEPHONE (OUTPATIENT)
Dept: FAMILY MEDICINE CLINIC | Age: 64
End: 2024-09-24

## 2024-09-24 DIAGNOSIS — F41.8 TEST ANXIETY: Primary | ICD-10-CM

## 2024-09-24 RX ORDER — ALPRAZOLAM 0.5 MG
0.5 TABLET ORAL
Qty: 1 TABLET | Refills: 0 | Status: SHIPPED | OUTPATIENT
Start: 2024-09-24 | End: 2024-09-24

## 2024-09-30 ENCOUNTER — HOSPITAL ENCOUNTER (OUTPATIENT)
Dept: MRI IMAGING | Age: 64
Discharge: HOME | End: 2024-10-02
Payer: COMMERCIAL

## 2024-09-30 DIAGNOSIS — R93.89 ABNORMAL ULTRASOUND: ICD-10-CM

## 2024-09-30 DIAGNOSIS — R10.2 PELVIC PAIN: ICD-10-CM

## 2024-09-30 PROCEDURE — 6360000004 HC RX CONTRAST MEDICATION: Performed by: NURSE PRACTITIONER

## 2024-09-30 PROCEDURE — 72197 MRI PELVIS W/O & W/DYE: CPT

## 2024-09-30 PROCEDURE — A9579 GAD-BASE MR CONTRAST NOS,1ML: HCPCS | Performed by: NURSE PRACTITIONER

## 2024-09-30 PROCEDURE — 2580000003 HC RX 258: Performed by: NURSE PRACTITIONER

## 2024-09-30 RX ORDER — 0.9 % SODIUM CHLORIDE 0.9 %
100 INTRAVENOUS SOLUTION INTRAVENOUS ONCE
Status: COMPLETED | OUTPATIENT
Start: 2024-09-30 | End: 2024-09-30

## 2024-09-30 RX ORDER — SODIUM CHLORIDE 0.9 % (FLUSH) 0.9 %
10 SYRINGE (ML) INJECTION ONCE
Status: COMPLETED | OUTPATIENT
Start: 2024-09-30 | End: 2024-09-30

## 2024-09-30 RX ADMIN — SODIUM CHLORIDE 100 ML: 9 INJECTION, SOLUTION INTRAVENOUS at 09:20

## 2024-09-30 RX ADMIN — SODIUM CHLORIDE, PRESERVATIVE FREE 10 ML: 5 INJECTION INTRAVENOUS at 09:24

## 2024-09-30 RX ADMIN — GADOTERIDOL 10 ML: 279.3 INJECTION, SOLUTION INTRAVENOUS at 09:24

## 2024-10-14 ENCOUNTER — HOSPITAL ENCOUNTER (OUTPATIENT)
Age: 64
Setting detail: OUTPATIENT SURGERY
Discharge: HOME | End: 2024-10-14
Attending: INTERNAL MEDICINE | Admitting: INTERNAL MEDICINE
Payer: COMMERCIAL

## 2024-10-14 ENCOUNTER — ANESTHESIA EVENT (OUTPATIENT)
Dept: OPERATING ROOM | Age: 64
End: 2024-10-14
Payer: COMMERCIAL

## 2024-10-14 ENCOUNTER — ANESTHESIA (OUTPATIENT)
Dept: OPERATING ROOM | Age: 64
End: 2024-10-14
Payer: COMMERCIAL

## 2024-10-14 ENCOUNTER — TELEPHONE (OUTPATIENT)
Dept: GASTROENTEROLOGY | Age: 64
End: 2024-10-14

## 2024-10-14 VITALS
HEIGHT: 64 IN | OXYGEN SATURATION: 98 % | WEIGHT: 126 LBS | HEART RATE: 77 BPM | TEMPERATURE: 97.9 F | SYSTOLIC BLOOD PRESSURE: 123 MMHG | RESPIRATION RATE: 26 BRPM | BODY MASS INDEX: 21.51 KG/M2 | DIASTOLIC BLOOD PRESSURE: 75 MMHG

## 2024-10-14 PROCEDURE — 2500000003 HC RX 250 WO HCPCS: Performed by: NURSE ANESTHETIST, CERTIFIED REGISTERED

## 2024-10-14 PROCEDURE — G0105 COLORECTAL SCRN; HI RISK IND: HCPCS | Performed by: INTERNAL MEDICINE

## 2024-10-14 PROCEDURE — 7100000010 HC PHASE II RECOVERY - FIRST 15 MIN: Performed by: INTERNAL MEDICINE

## 2024-10-14 PROCEDURE — 2580000003 HC RX 258

## 2024-10-14 PROCEDURE — 3700000000 HC ANESTHESIA ATTENDED CARE: Performed by: INTERNAL MEDICINE

## 2024-10-14 PROCEDURE — 2709999900 HC NON-CHARGEABLE SUPPLY: Performed by: INTERNAL MEDICINE

## 2024-10-14 PROCEDURE — 6360000002 HC RX W HCPCS: Performed by: NURSE ANESTHETIST, CERTIFIED REGISTERED

## 2024-10-14 PROCEDURE — 3609027000 HC COLONOSCOPY: Performed by: INTERNAL MEDICINE

## 2024-10-14 PROCEDURE — 7100000011 HC PHASE II RECOVERY - ADDTL 15 MIN: Performed by: INTERNAL MEDICINE

## 2024-10-14 PROCEDURE — 3700000001 HC ADD 15 MINUTES (ANESTHESIA): Performed by: INTERNAL MEDICINE

## 2024-10-14 RX ORDER — SODIUM CHLORIDE, SODIUM LACTATE, POTASSIUM CHLORIDE, CALCIUM CHLORIDE 600; 310; 30; 20 MG/100ML; MG/100ML; MG/100ML; MG/100ML
INJECTION, SOLUTION INTRAVENOUS CONTINUOUS
Status: DISCONTINUED | OUTPATIENT
Start: 2024-10-14 | End: 2024-10-14 | Stop reason: HOSPADM

## 2024-10-14 RX ORDER — PROPOFOL 10 MG/ML
INJECTION, EMULSION INTRAVENOUS
Status: DISCONTINUED | OUTPATIENT
Start: 2024-10-14 | End: 2024-10-14 | Stop reason: SDUPTHER

## 2024-10-14 RX ORDER — SODIUM CHLORIDE 0.9 % (FLUSH) 0.9 %
5-40 SYRINGE (ML) INJECTION EVERY 12 HOURS SCHEDULED
Status: DISCONTINUED | OUTPATIENT
Start: 2024-10-14 | End: 2024-10-14 | Stop reason: HOSPADM

## 2024-10-14 RX ORDER — SODIUM CHLORIDE 9 MG/ML
INJECTION, SOLUTION INTRAVENOUS PRN
Status: DISCONTINUED | OUTPATIENT
Start: 2024-10-14 | End: 2024-10-14 | Stop reason: HOSPADM

## 2024-10-14 RX ORDER — SODIUM CHLORIDE 0.9 % (FLUSH) 0.9 %
5-40 SYRINGE (ML) INJECTION PRN
Status: DISCONTINUED | OUTPATIENT
Start: 2024-10-14 | End: 2024-10-14 | Stop reason: HOSPADM

## 2024-10-14 RX ORDER — LIDOCAINE HYDROCHLORIDE 10 MG/ML
1 INJECTION, SOLUTION EPIDURAL; INFILTRATION; INTRACAUDAL; PERINEURAL
Status: DISCONTINUED | OUTPATIENT
Start: 2024-10-15 | End: 2024-10-14 | Stop reason: HOSPADM

## 2024-10-14 RX ORDER — LIDOCAINE HYDROCHLORIDE 20 MG/ML
INJECTION, SOLUTION INFILTRATION; PERINEURAL
Status: DISCONTINUED | OUTPATIENT
Start: 2024-10-14 | End: 2024-10-14 | Stop reason: SDUPTHER

## 2024-10-14 RX ORDER — SODIUM CHLORIDE 9 MG/ML
INJECTION, SOLUTION INTRAVENOUS CONTINUOUS
Status: DISCONTINUED | OUTPATIENT
Start: 2024-10-14 | End: 2024-10-14 | Stop reason: HOSPADM

## 2024-10-14 RX ADMIN — PROPOFOL 50 MG: 10 INJECTION, EMULSION INTRAVENOUS at 09:49

## 2024-10-14 RX ADMIN — PROPOFOL 125 MCG/KG/MIN: 10 INJECTION, EMULSION INTRAVENOUS at 09:48

## 2024-10-14 RX ADMIN — LIDOCAINE HYDROCHLORIDE 50 MG: 20 INJECTION, SOLUTION INFILTRATION; PERINEURAL at 09:48

## 2024-10-14 RX ADMIN — SODIUM CHLORIDE, POTASSIUM CHLORIDE, SODIUM LACTATE AND CALCIUM CHLORIDE: 600; 310; 30; 20 INJECTION, SOLUTION INTRAVENOUS at 09:00

## 2024-10-14 RX ADMIN — PROPOFOL 50 MG: 10 INJECTION, EMULSION INTRAVENOUS at 09:54

## 2024-10-14 ASSESSMENT — PAIN DESCRIPTION - DESCRIPTORS: DESCRIPTORS: CRAMPING

## 2024-10-14 ASSESSMENT — PAIN - FUNCTIONAL ASSESSMENT
PAIN_FUNCTIONAL_ASSESSMENT: NONE - DENIES PAIN
PAIN_FUNCTIONAL_ASSESSMENT: 0-10

## 2024-10-14 NOTE — H&P
History and Physical Service   [facility]    HISTORY AND PHYSICAL EXAMINATION            Date of Evaluation: 10/14/2024  Patient name:  Layla Hayden  MRN:   [id number]  YOB: 1960  PCP:    Ron Lara APRN - CNP    History Obtained From:     Patient, medical records    History of Present Illness:     This is Layla Hayden a 64 y.o. female who presents today for a COLORECTAL CANCER SCREENING, NOT HIGH RISK by Faustino Quinones MD for Screen for colon cancer. Patient denies bowel changes. She does report lower abdominal cramping for some time. She states it comes and goes and does not seem to correlate with eating. She denies bloody tarry stools, diarrhea alternating with constipation, nausea, vomiting, or unintentional weight loss. Patient followed bowel prep until watery clear. Patient has had previous colonoscopy. No FH colon cancer. Personal history of polyps. Denies fever, chills, shortness of breath, cough, congestion, wheezing, chest pain, open sores or wounds. Denies hx of diabetes. Denies any current blood thinning medications.     Past Medical History:     Past Medical History:   Diagnosis Date    Acute insomnia     Anxiety     Breast cancer (HCC) 07/2020    right breast    Depression     MDRO (multiple drug resistant organisms) resistance 10/10/2018    E. Coli urine    Overactive bladder         Past Surgical History:     Past Surgical History:   Procedure Laterality Date    BREAST RECONSTRUCTION Right     COLONOSCOPY      polyps    ENDOSCOPY, COLON, DIAGNOSTIC      MASTECTOMY Right 11/2019        Medications Prior to Admission:     Prior to Admission medications    Medication Sig Start Date End Date Taking? Authorizing Provider   oxyBUTYnin (DITROPAN-XL) 10 MG extended release tablet Take 1 tablet by mouth 2 times daily 8/20/24  Yes Ron Lara, APRN - CNP   zolpidem (AMBIEN) 10 MG tablet Take 1 tablet by mouth nightly as needed for Sleep for up to 60

## 2024-10-14 NOTE — ANESTHESIA PRE PROCEDURE
Department of Anesthesiology  Preprocedure Note       Name:  Layla Hayden   Age:  64 y.o.  :  1960                                          MRN:  [id number]         Date:  10/14/2024      Surgeon: Surgeon(s):  Faustino Quinones MD    Procedure: Procedure(s):  COLORECTAL CANCER SCREENING, NOT HIGH RISK    Medications prior to admission:   Prior to Admission medications    Medication Sig Start Date End Date Taking? Authorizing Provider   oxyBUTYnin (DITROPAN-XL) 10 MG extended release tablet Take 1 tablet by mouth 2 times daily 24  Yes Ron Lara APRN - CNP   zolpidem (AMBIEN) 10 MG tablet Take 1 tablet by mouth nightly as needed for Sleep for up to 60 days. Max Daily Amount: 10 mg 8/19/24 10/18/24 Yes Ron Lara APRN - CNP   diclofenac sodium (VOLTAREN) 1 % GEL Apply 4 g topically 4 times daily 24  Yes Josy Julian PA-C   sertraline (ZOLOFT) 100 MG tablet Take 1 tablet by mouth daily 23  Yes Ron Lara APRN - CNP   letrozole (FEMARA) 2.5 MG tablet Take 1 tablet by mouth 19  Yes Provider, MD Gaby       Current medications:    Current Facility-Administered Medications   Medication Dose Route Frequency Provider Last Rate Last Admin   • [START ON 10/15/2024] lidocaine PF 1 % injection 1 mL  1 mL IntraDERmal Once PRN Rhys Greene, DO       • 0.9 % sodium chloride infusion   IntraVENous Continuous Rhys Greene, DO       • lactated ringers IV soln infusion   IntraVENous Continuous Rhys Greene  mL/hr at 10/14/24 0900 New Bag at 10/14/24 0900   • sodium chloride flush 0.9 % injection 5-40 mL  5-40 mL IntraVENous 2 times per day Rhys Greene, DO       • sodium chloride flush 0.9 % injection 5-40 mL  5-40 mL IntraVENous PRN Rhys Greene, DO       • 0.9 % sodium chloride infusion   IntraVENous PRN Rhys Greene, DO           Allergies:  No Known Allergies    Problem List:    Patient Active Problem List   Diagnosis Code   • Acute insomnia G47.00   •

## 2024-10-14 NOTE — ANESTHESIA POSTPROCEDURE EVALUATION
Department of Anesthesiology  Postprocedure Note    Patient: Layla Hayden  MRN: [id number]  YOB: 1960  Date of evaluation: 10/14/2024    Procedure Summary       Date: 10/14/24 Room / Location: [address]    Anesthesia Start: 0945 Anesthesia Stop: 1026    Procedure: COLORECTAL CANCER SCREENING, NOT HIGH RISK Diagnosis:       Screen for colon cancer      (Screen for colon cancer [Z12.11])    Surgeons: Faustino Quinones MD Responsible Provider: Lacy Diop MD    Anesthesia Type: MAC ASA Status: 3            Anesthesia Type: No value filed.    Sari Phase I:      Sari Phase II: Sari Score: 10    Anesthesia Post Evaluation    Patient location during evaluation: PACU  Patient participation: complete - patient participated  Level of consciousness: awake and alert  Airway patency: patent  Nausea & Vomiting: no nausea and no vomiting  Cardiovascular status: hemodynamically stable  Respiratory status: acceptable  Hydration status: euvolemic  Pain management: adequate    No notable events documented.

## 2024-10-14 NOTE — DISCHARGE INSTRUCTIONS
[facility]    POST-ENDOSCOPY INSTRUCTIONS    1. ACTIVITY   No driving, operating machinery, or making important decisions for 24 hours.    Resume normal activity after 24 hours.  You may return to work after 24 hours.    2. DIET        Resume your usual diet unless specified below.   ***    3. MEDICATIONS    Resume your usual medications.     Do not consume alcohol, tranquilizers, or sleeping medications for 24 hour unless advised by your physician.                 4. PHYSICIAN FOLLOW-UP / INSTRUCTIONS    Please call the office/clinic in 10 days for biopsy results:      [  ] GI office:  [phone]          Follow up with your family physician as planned.    6. NORMAL CHANGES YOU MAY EXPERIENCE AFTER ENDOSCOPY:          COLONOSCOPY      Passing of gas for several hours.      Some mild abdominal cramping.                You may feel fatigued for the next 24-48   hours due to the prep and sedation    7. CALL YOUR PHYSICIAN IF YOU EXPERIENCE ANY OF THE FOLLOWING      A.  Passing blood rectally or vomiting blood (color may be red or black)      B.  Severe abdominal pain or tenderness (that is not relieved by passing air)      C.  Fever, chills, or excessive sweating      D.  Persistent nausea or vomiting      E.  Redness or swelling at the IV site    If you have additional questions, PLEASE call your doctor or the [facility] GI Unit ([phone])

## 2024-10-14 NOTE — TELEPHONE ENCOUNTER
----- Message from Dr. Faustino Quinones MD sent at 10/14/2024 10:21 AM EDT -----  Repeat scope in 3 years with 2-day prep

## 2024-10-14 NOTE — OP NOTE
Operative Note      Patient: Layla Hayden  YOB: 1960  MRN: [id number]    Date of Procedure: 10/14/2024    Pre-Op Diagnosis Codes:      * Screen for colon cancer [Z12.11]        History of polyp    Post-Op Diagnosis:  Internal hemorrhoids       Procedure(s):  COLORECTAL CANCER SCREENING, NOT HIGH RISK    Surgeon(s):  Faustino Quinones MD    Assistant:   * No surgical staff found *    Anesthesia: Monitor Anesthesia Care    Estimated Blood Loss (mL): Minimal    Complications: None    Specimens:   * No specimens in log *    Implants:  * No implants in log *      Drains: * No LDAs found *    Findings:  Infection Present At Time Of Surgery (PATOS) (choose all levels that have infection present):  No infection present           Scope withdrawal time: 9 min     Description of Procedure:  Informed consent was obtained from the patient after explanation of the procedure including indications, description of the procedure,  benefits and possible risks and complications of the procedure, and alternatives. Questions were answered.  The patient's history was reviewed and a directed physical examination was performed prior to the procedure.    Patient was monitored throughout the procedure with pulse oximetry and periodic assessment of vital signs. Patient was sedated as noted above. With the patient initially in the left lateral decubitus position, a digital rectal examination was performed and revealed negative without mass, lesions or tenderness.  The Olympus video colonoscope was placed in the patient's rectum and advanced without difficulty  to the cecum, which was identified by the ileocecal valve and appendiceal orifice.  The prep was satisfactory.  Examination of the mucosa was performed during both introduction and withdrawal of the colonoscope. Retroflexed view of the rectum was performed.  The patient  was taken to the recovery area in good condition.     Findings:     1.  Internal hemorrhoids were seen

## 2024-10-17 DIAGNOSIS — F32.A ANXIETY AND DEPRESSION: ICD-10-CM

## 2024-10-17 DIAGNOSIS — F41.9 ANXIETY AND DEPRESSION: ICD-10-CM

## 2024-10-17 RX ORDER — ZOLPIDEM TARTRATE 10 MG/1
10 TABLET ORAL NIGHTLY PRN
Qty: 30 TABLET | Refills: 1 | Status: SHIPPED | OUTPATIENT
Start: 2024-10-17 | End: 2024-12-16

## 2024-12-02 DIAGNOSIS — N32.81 OVERACTIVE BLADDER: ICD-10-CM

## 2024-12-02 RX ORDER — OXYBUTYNIN CHLORIDE 10 MG/1
10 TABLET, EXTENDED RELEASE ORAL 2 TIMES DAILY
Qty: 180 TABLET | Refills: 1 | Status: SHIPPED | OUTPATIENT
Start: 2024-12-02

## 2024-12-02 NOTE — TELEPHONE ENCOUNTER
Layla Hayden is calling to request a refill on the following medication(s):    Last Visit Date (If Applicable):  7/15/2024    Next Visit Date:    12/23/2024    Medication Request:  Requested Prescriptions     Pending Prescriptions Disp Refills    oxyBUTYnin (DITROPAN-XL) 10 MG extended release tablet 180 tablet 1     Sig: Take 1 tablet by mouth 2 times daily

## 2024-12-16 DIAGNOSIS — F41.9 ANXIETY AND DEPRESSION: ICD-10-CM

## 2024-12-16 DIAGNOSIS — F32.A ANXIETY AND DEPRESSION: ICD-10-CM

## 2024-12-16 RX ORDER — SERTRALINE HYDROCHLORIDE 100 MG/1
150 TABLET, FILM COATED ORAL DAILY
Qty: 270 TABLET | Refills: 1 | Status: SHIPPED | OUTPATIENT
Start: 2024-12-16

## 2024-12-16 RX ORDER — ZOLPIDEM TARTRATE 10 MG/1
10 TABLET ORAL NIGHTLY PRN
Qty: 30 TABLET | Refills: 1 | Status: SHIPPED | OUTPATIENT
Start: 2024-12-16 | End: 2025-02-14

## 2024-12-16 NOTE — TELEPHONE ENCOUNTER
Pt stated anxiety is getting worse please raise the dose of Zoloft also the oxybutynin is no longer working very well and the cost has gone up pt asking to try a different bladder control medication please.

## 2024-12-23 ENCOUNTER — OFFICE VISIT (OUTPATIENT)
Dept: FAMILY MEDICINE CLINIC | Age: 64
End: 2024-12-23
Payer: COMMERCIAL

## 2024-12-23 VITALS
SYSTOLIC BLOOD PRESSURE: 124 MMHG | HEART RATE: 74 BPM | WEIGHT: 129.8 LBS | OXYGEN SATURATION: 98 % | DIASTOLIC BLOOD PRESSURE: 84 MMHG | BODY MASS INDEX: 22.28 KG/M2

## 2024-12-23 DIAGNOSIS — N32.81 OVERACTIVE BLADDER: Primary | ICD-10-CM

## 2024-12-23 DIAGNOSIS — Z13.220 LIPID SCREENING: ICD-10-CM

## 2024-12-23 DIAGNOSIS — F32.A ANXIETY AND DEPRESSION: ICD-10-CM

## 2024-12-23 DIAGNOSIS — M79.604 BILATERAL LEG PAIN: ICD-10-CM

## 2024-12-23 DIAGNOSIS — M79.605 BILATERAL LEG PAIN: ICD-10-CM

## 2024-12-23 DIAGNOSIS — Z13.1 DIABETES MELLITUS SCREENING: ICD-10-CM

## 2024-12-23 DIAGNOSIS — F41.9 ANXIETY AND DEPRESSION: ICD-10-CM

## 2024-12-23 LAB
BUN / CREAT RATIO: NORMAL
BUN BLDV-MCNC: 10 MG/DL
CREAT SERPL-MCNC: 0.77 MG/DL

## 2024-12-23 PROCEDURE — 1123F ACP DISCUSS/DSCN MKR DOCD: CPT | Performed by: NURSE PRACTITIONER

## 2024-12-23 PROCEDURE — 99214 OFFICE O/P EST MOD 30 MIN: CPT | Performed by: NURSE PRACTITIONER

## 2024-12-23 RX ORDER — GABAPENTIN 100 MG/1
100 CAPSULE ORAL NIGHTLY
Qty: 30 CAPSULE | Refills: 2 | Status: SHIPPED | OUTPATIENT
Start: 2024-12-23 | End: 2025-03-23

## 2024-12-23 RX ORDER — MIRABEGRON 25 MG/1
25 TABLET, FILM COATED, EXTENDED RELEASE ORAL DAILY
Qty: 30 TABLET | Refills: 2 | Status: SHIPPED | OUTPATIENT
Start: 2024-12-23

## 2024-12-23 RX ORDER — SERTRALINE HYDROCHLORIDE 100 MG/1
200 TABLET, FILM COATED ORAL DAILY
Qty: 180 TABLET | Refills: 1 | Status: SHIPPED | OUTPATIENT
Start: 2024-12-23

## 2024-12-23 ASSESSMENT — ENCOUNTER SYMPTOMS
ABDOMINAL PAIN: 0
VOMITING: 0
COUGH: 0
SINUS PAIN: 0
BACK PAIN: 1
NAUSEA: 0
DIARRHEA: 0
SORE THROAT: 0
SHORTNESS OF BREATH: 0

## 2024-12-23 NOTE — PROGRESS NOTES
No current facility-administered medications for this visit.     No Known Allergies    Subjective:      Review of Systems   Constitutional:  Negative for chills and fever.   HENT:  Negative for ear pain, sinus pain and sore throat.    Respiratory:  Negative for cough and shortness of breath.    Cardiovascular:  Negative for chest pain and palpitations.   Gastrointestinal:  Negative for abdominal pain, diarrhea, nausea and vomiting.   Musculoskeletal:  Positive for back pain.   Neurological:  Negative for dizziness and headaches.   All other systems reviewed and are negative.      :Objective     Physical Exam  Vitals and nursing note reviewed.   Constitutional:       General: She is not in acute distress.     Appearance: Normal appearance. She is not toxic-appearing.   Cardiovascular:      Rate and Rhythm: Normal rate.   Pulmonary:      Effort: Pulmonary effort is normal.      Breath sounds: Normal breath sounds.   Neurological:      General: No focal deficit present.      Mental Status: She is alert and oriented to person, place, and time.       /84 (Site: Right Upper Arm)   Pulse 74   Wt 58.9 kg (129 lb 12.8 oz)   SpO2 98%   BMI 22.28 kg/m²     Lab Review   No visits with results within 2 Month(s) from this visit.   Latest known visit with results is:   Hospital Outpatient Visit on 09/16/2024   Component Date Value    Creatinine 09/16/2024 0.7     Est, Glom Filt Rate 09/16/2024 >90        Assessment and Plan   Assessment & Plan   1. Overactive bladder  -     mirabegron (MYRBETRIQ) 25 MG TB24; Take 1 tablet by mouth daily, Disp-30 tablet, R-2Normal  2. Bilateral leg pain  -     gabapentin (NEURONTIN) 100 MG capsule; Take 1 capsule by mouth at bedtime for 90 days. Intended supply: 30 days Max Daily Amount: 100 mg, Disp-30 capsule, R-2Normal  3. Lipid screening  -     Lipid Panel; Future  4. Diabetes mellitus screening  5. Anxiety and depression  -     sertraline (ZOLOFT) 100 MG tablet; Take 2 tablets

## 2025-01-02 DIAGNOSIS — N32.81 OVERACTIVE BLADDER: Primary | ICD-10-CM

## 2025-02-03 ENCOUNTER — OFFICE VISIT (OUTPATIENT)
Age: 65
End: 2025-02-03
Payer: COMMERCIAL

## 2025-02-03 VITALS — BODY MASS INDEX: 22.2 KG/M2 | WEIGHT: 130 LBS | HEIGHT: 64 IN

## 2025-02-03 DIAGNOSIS — R35.1 NOCTURIA: ICD-10-CM

## 2025-02-03 DIAGNOSIS — N39.41 URGE INCONTINENCE: ICD-10-CM

## 2025-02-03 DIAGNOSIS — N32.81 OVERACTIVE BLADDER: Primary | ICD-10-CM

## 2025-02-03 DIAGNOSIS — R35.0 FREQUENCY OF URINATION: ICD-10-CM

## 2025-02-03 LAB
BILIRUBIN, POC: NORMAL
BLOOD URINE, POC: NORMAL
CLARITY, POC: CLEAR
COLOR, POC: YELLOW
GLUCOSE URINE, POC: NORMAL MG/DL
KETONES, POC: NORMAL
LEUKOCYTE EST, POC: NORMAL
NITRITE, POC: NORMAL
PH, POC: NORMAL
PROTEIN, POC: NORMAL MG/DL
SPECIFIC GRAVITY, POC: NORMAL
UROBILINOGEN, POC: NORMAL

## 2025-02-03 PROCEDURE — 1123F ACP DISCUSS/DSCN MKR DOCD: CPT | Performed by: SPECIALIST

## 2025-02-03 PROCEDURE — 51798 US URINE CAPACITY MEASURE: CPT | Performed by: SPECIALIST

## 2025-02-03 PROCEDURE — 99204 OFFICE O/P NEW MOD 45 MIN: CPT | Performed by: SPECIALIST

## 2025-02-03 PROCEDURE — 81003 URINALYSIS AUTO W/O SCOPE: CPT | Performed by: SPECIALIST

## 2025-02-03 NOTE — PROGRESS NOTES
Irwin Wild MD [facility] Urology Consultation / New Patient Visit    Patient:  Layla Hayden  YOB: 1960  Date: 2/3/2025    Patient seen at the request of Ron Lara APRN - CNP  for purpose of evaluation of overactive bladder and Urge urinary incontinence.    HISTORY OF PRESENT ILLNESS:   The patient is a 64 y.o. female who presents today for evaluation of the following problems:   The patient presents with overactive bladder symptoms for several years.  She now has Urge urinary incontinence and has to change her clothes daily.  She has tried Myrbetriq 25 mg and Oxybutynin ER 10 mg po qd for OAB symptoms without improvement.   She drinks 4 cups of coffee a day which makes her urgency and frequency worse.  Patient instructed to avoid bladder irritants in diet such as coffee, tea, caffeine, alcohol, carbonated beverages, spicy/acidic foods.  Patient given a list of these to avoid.   Patient was instructed to keep a voiding diary for 3 days in order to document urine output, frequency of urination and functional bladder capacity.   Patient instructed to limit fluid intake 2-3 hours prior to bedtime to minimize nocturia or nocturnal incontinence.   Will begin Gemtesa (vibegron) 75 mg po qd for OAB using office samples after she completes her voiding diary.  Lower urinary tract symptoms: urgency, frequency, hesitancy, decreased urinary stream, nocturia, 3 times per night, and feelings of DAVID and straining to urinate   Today's AUA Symptom Score (QOL): 21 (6)  (Patient's old records have been requested, reviewed and summarized in today's note: 12/23/24 office note of Ron Lara APRN - CNP)    Summary of old records:   OAB, urgency, frequency: PVR = 24 mL; tried Myrbetriq 25 mg, Oxybutynin ER 10 mg qd wo success; needs voiding diary; reduce 4 cups of coffee a day; samples of Gemtesa 75 mg qd 2/3/25  Urge urinary incontinence: change clothes once daily  Nocturia x

## 2025-02-17 ENCOUNTER — TELEPHONE (OUTPATIENT)
Dept: FAMILY MEDICINE CLINIC | Age: 65
End: 2025-02-17

## 2025-02-17 DIAGNOSIS — G47.00 ACUTE INSOMNIA: Primary | ICD-10-CM

## 2025-02-17 RX ORDER — ZOLPIDEM TARTRATE 10 MG/1
10 TABLET ORAL NIGHTLY PRN
Qty: 30 TABLET | Refills: 0 | Status: SHIPPED | OUTPATIENT
Start: 2025-02-17 | End: 2025-03-19

## 2025-02-17 NOTE — TELEPHONE ENCOUNTER
Patient is asking for a refill on Patient asking     zolpidem (AMBIEN) 10 MG tablet [[id number]]       Layla Hayden is calling to request a refill on the following medication(s):    Last Visit Date (If Applicable):  12/23/2024    Next Visit Date:    6/23/2025    Medication Request:  Requested Prescriptions      No prescriptions requested or ordered in this encounter

## 2025-03-06 DIAGNOSIS — Z12.31 ENCOUNTER FOR SCREENING MAMMOGRAM FOR BREAST CANCER: ICD-10-CM

## 2025-03-10 ENCOUNTER — OFFICE VISIT (OUTPATIENT)
Age: 65
End: 2025-03-10
Payer: COMMERCIAL

## 2025-03-10 DIAGNOSIS — R35.1 NOCTURIA: ICD-10-CM

## 2025-03-10 DIAGNOSIS — R35.0 FREQUENCY OF URINATION: ICD-10-CM

## 2025-03-10 DIAGNOSIS — N32.81 OVERACTIVE BLADDER: Primary | ICD-10-CM

## 2025-03-10 DIAGNOSIS — N39.41 URGE INCONTINENCE: ICD-10-CM

## 2025-03-10 PROCEDURE — 1159F MED LIST DOCD IN RCRD: CPT | Performed by: SPECIALIST

## 2025-03-10 PROCEDURE — 81003 URINALYSIS AUTO W/O SCOPE: CPT | Performed by: SPECIALIST

## 2025-03-10 PROCEDURE — 51798 US URINE CAPACITY MEASURE: CPT | Performed by: SPECIALIST

## 2025-03-10 PROCEDURE — 1123F ACP DISCUSS/DSCN MKR DOCD: CPT | Performed by: SPECIALIST

## 2025-03-10 PROCEDURE — 99213 OFFICE O/P EST LOW 20 MIN: CPT | Performed by: SPECIALIST

## 2025-03-10 NOTE — PROGRESS NOTES
Irwin Wild MD FACS    [facility] Urology Office Progress Note    Patient:  Layla Hayden  YOB: 1960  Date: 3/10/2025    HISTORY OF PRESENT ILLNESS:   The patient is a 65 y.o. female  Patient completed her voiding diary but never took the samples of Gemtesa (vibegron) 75 mg po qd for OAB we gave her.  Patient instructed to avoid bladder irritants in diet such as coffee, tea, caffeine, alcohol, carbonated beverages, spicy/acidic foods.  Patient given a list of these to avoid.   Patient to call after 3 weeks with an update on her symptoms.  Patient given information on Botox and Interstim but she may need a Cystoscopy and urodynamics to determine etiology of incontinence if she does not respond to Gemtesa.  Lower urinary tract symptoms: urgency, frequency, hesitancy, decreased urinary stream, nocturia, 2 times per night, and feelings of DAVID    Last AUA Symptom Score (QOL): 21 (6)  Today's AUA Symptom Score (QOL): 26 (6)    Summary of old records:   OAB, urgency, frequency: PVR = 24 mL; tried Myrbetriq 25 mg, Oxybutynin ER 10 mg qd wo success; needs voiding diary; reduce 4 cups of coffee a day; samples of Gemtesa 75 mg qd 2/3/25  Urge urinary incontinence: change clothes once daily  Nocturia x 3: nocturnal fluid restriction      Additional History: none    Procedures Today: Postvoid Residual:  Post-void Residual by bladder scanner: 0 mL      Urinalysis today:  Results for orders placed or performed in visit on 03/10/25   POCT Urinalysis No Micro (Auto)   Result Value Ref Range    Color, UA yellow     Clarity, UA clear     Glucose, UA POC neg mg/dL    Bilirubin, UA      Ketones, UA      Spec Grav, UA      Blood, UA POC neg     pH, UA      Protein, UA POC neg mg/dL    Urobilinogen, UA      Leukocytes, UA neg     Nitrite, UA neg        Last BUN and creatinine:  Lab Results   Component Value Date    BUN 10 12/23/2024     Lab Results   Component Value Date    CREATININE 0.77 12/23/2024

## 2025-03-12 ENCOUNTER — TELEPHONE (OUTPATIENT)
Dept: FAMILY MEDICINE CLINIC | Age: 65
End: 2025-03-12

## 2025-03-12 NOTE — TELEPHONE ENCOUNTER
Called and spoke to the patients. She states her pain feels like its behind her left breast- she had a mammogram done and that was normal.   Patient schedule with Kumar on Tuesday, 3/18/25.     Patient advised to go to the ER with any additional symptoms

## 2025-03-12 NOTE — TELEPHONE ENCOUNTER
----- Message from Castillo BAIRD sent at 3/12/2025 10:13 AM EDT -----  Regarding: ECC Escalation To Practice  ECC Escalation To Practice      Type of Escalation: Red Flag Symptom  --------------------------------------------------------------------------------------------------------------------------    Information for Provider:  Patient is looking for appointment for: Symptom left side of her chest pains    Reasons for Message: Patient disconnected     Additional Information : Pt would like to schedule an appointment due to pt is having pain on the left side of her chest.  --------------------------------------------------------------------------------------------------------------------------    Relationship to Patient: Self  Call Back Info: OK to leave message on voicemail  Preferred Call Back Number: Phone [phone]

## 2025-03-18 ENCOUNTER — OFFICE VISIT (OUTPATIENT)
Dept: FAMILY MEDICINE CLINIC | Age: 65
End: 2025-03-18
Payer: COMMERCIAL

## 2025-03-18 VITALS
WEIGHT: 133 LBS | OXYGEN SATURATION: 98 % | HEIGHT: 64 IN | HEART RATE: 93 BPM | DIASTOLIC BLOOD PRESSURE: 72 MMHG | BODY MASS INDEX: 22.71 KG/M2 | SYSTOLIC BLOOD PRESSURE: 108 MMHG | TEMPERATURE: 97.9 F

## 2025-03-18 DIAGNOSIS — G47.00 ACUTE INSOMNIA: ICD-10-CM

## 2025-03-18 DIAGNOSIS — R07.9 CHEST PAIN, UNSPECIFIED TYPE: Primary | ICD-10-CM

## 2025-03-18 PROCEDURE — 1126F AMNT PAIN NOTED NONE PRSNT: CPT | Performed by: NURSE PRACTITIONER

## 2025-03-18 PROCEDURE — 99213 OFFICE O/P EST LOW 20 MIN: CPT | Performed by: NURSE PRACTITIONER

## 2025-03-18 PROCEDURE — 1159F MED LIST DOCD IN RCRD: CPT | Performed by: NURSE PRACTITIONER

## 2025-03-18 PROCEDURE — 1123F ACP DISCUSS/DSCN MKR DOCD: CPT | Performed by: NURSE PRACTITIONER

## 2025-03-18 RX ORDER — ZOLPIDEM TARTRATE 10 MG/1
10 TABLET ORAL NIGHTLY PRN
Qty: 30 TABLET | Refills: 0 | Status: SHIPPED | OUTPATIENT
Start: 2025-03-18 | End: 2025-04-17

## 2025-03-18 SDOH — ECONOMIC STABILITY: FOOD INSECURITY: WITHIN THE PAST 12 MONTHS, YOU WORRIED THAT YOUR FOOD WOULD RUN OUT BEFORE YOU GOT MONEY TO BUY MORE.: NEVER TRUE

## 2025-03-18 SDOH — ECONOMIC STABILITY: FOOD INSECURITY: WITHIN THE PAST 12 MONTHS, THE FOOD YOU BOUGHT JUST DIDN'T LAST AND YOU DIDN'T HAVE MONEY TO GET MORE.: NEVER TRUE

## 2025-03-18 ASSESSMENT — PATIENT HEALTH QUESTIONNAIRE - PHQ9
1. LITTLE INTEREST OR PLEASURE IN DOING THINGS: NOT AT ALL
SUM OF ALL RESPONSES TO PHQ QUESTIONS 1-9: 0
10. IF YOU CHECKED OFF ANY PROBLEMS, HOW DIFFICULT HAVE THESE PROBLEMS MADE IT FOR YOU TO DO YOUR WORK, TAKE CARE OF THINGS AT HOME, OR GET ALONG WITH OTHER PEOPLE: NOT DIFFICULT AT ALL
SUM OF ALL RESPONSES TO PHQ QUESTIONS 1-9: 0
2. FEELING DOWN, DEPRESSED OR HOPELESS: NOT AT ALL
7. TROUBLE CONCENTRATING ON THINGS, SUCH AS READING THE NEWSPAPER OR WATCHING TELEVISION: NOT AT ALL
8. MOVING OR SPEAKING SO SLOWLY THAT OTHER PEOPLE COULD HAVE NOTICED. OR THE OPPOSITE, BEING SO FIGETY OR RESTLESS THAT YOU HAVE BEEN MOVING AROUND A LOT MORE THAN USUAL: NOT AT ALL
6. FEELING BAD ABOUT YOURSELF - OR THAT YOU ARE A FAILURE OR HAVE LET YOURSELF OR YOUR FAMILY DOWN: NOT AT ALL
5. POOR APPETITE OR OVEREATING: NOT AT ALL
9. THOUGHTS THAT YOU WOULD BE BETTER OFF DEAD, OR OF HURTING YOURSELF: NOT AT ALL
3. TROUBLE FALLING OR STAYING ASLEEP: NOT AT ALL
SUM OF ALL RESPONSES TO PHQ QUESTIONS 1-9: 0
SUM OF ALL RESPONSES TO PHQ QUESTIONS 1-9: 0
4. FEELING TIRED OR HAVING LITTLE ENERGY: NOT AT ALL

## 2025-03-18 ASSESSMENT — ENCOUNTER SYMPTOMS
SINUS PAIN: 0
ABDOMINAL PAIN: 0
COUGH: 0
DIARRHEA: 0
VOMITING: 0
NAUSEA: 0
SHORTNESS OF BREATH: 0
SORE THROAT: 0

## 2025-03-18 NOTE — TELEPHONE ENCOUNTER
Patient contacted office asking for refill on:  -Ambien    [facility] on [address]    Last visit: 12/23/24  Last Med refill: 2/17/25  Does patient have enough medication for 72 hours: yes    Next Visit Date:  Future Appointments   Date Time Provider Department Center   3/18/2025 11:50 AM Ron Lara APRN - CNP [facility]   3/24/2025  1:30 PM Ron Lara APRN - CNP [facility]   6/23/2025 10:30 AM Ron Lara APRN - CNP [facility]   8/18/2025 11:00 AM Keyla Valles APRN - CNP [id number]       Health Maintenance   Topic Date Due    Shingles vaccine (1 of 2) Never done    Pneumococcal 50+ years Vaccine (1 of 1 - PCV) Never done    Respiratory Syncytial Virus (RSV) Pregnant or age 60 yrs+ (1 - Risk 60-74 years 1-dose series) Never done    Flu vaccine (1) Never done    COVID-19 Vaccine (2 - 2024-25 season) 09/01/2024    Annual Wellness Visit (Medicare Advantage)  01/01/2025    Depression Monitoring  07/15/2025    Breast cancer screen  03/03/2026    DTaP/Tdap/Td vaccine (2 - Td or Tdap) 11/28/2026    Lipids  10/30/2028    Colorectal Cancer Screen  10/14/2034    DEXA (modify frequency per FRAX score)  Completed    Hepatitis C screen  Completed    Hepatitis A vaccine  Aged Out    Hepatitis B vaccine  Aged Out    Hib vaccine  Aged Out    Polio vaccine  Aged Out    Meningococcal (ACWY) vaccine  Aged Out    Meningococcal B vaccine  Aged Out    Depression Screen  Discontinued    HIV screen  Discontinued    Cervical cancer screen  Discontinued       Hemoglobin A1C (%)   Date Value   10/17/2022 5.2   03/14/2022 5.4             ( goal A1C is < 7)   No components found for: \"LABMICR\"  No components found for: \"LDLCHOLESTEROL\", \"LDLCALC\"    (goal LDL is <100)   AST (U/L)   Date Value   10/30/2023 33 (H)     ALT (U/L)   Date Value   10/30/2023 26     BUN (mg/dL)   Date Value   12/23/2024 10     BP Readings from Last 3 Encounters:   12/23/24 124/84   10/14/24 123/75

## 2025-03-18 NOTE — PROGRESS NOTES
[facility]  [facility]  [address] [address]  Dept: [phone]  Dept Fax: [phone]    Layla Hayden is a 65 y.o. female who presents today for her medicalconditions/complaints as noted below.  Layla Hayden is c/o of Chest Pain (Chest pain off and on , sharp pain over 3 weeks , mammogram normal)      HPI:         65-year-old female patient presents with concern for chest pain.  Reports sharp left-sided chest pain ongoing for the past 3 weeks.  Reports it is intermittent pain.  Reports that it does occur at rest.  Has been under some family stress recently due to her mother's illness.  Patient did complete evaluation in 2024 including a negative stress test as well as a CT scan of the chest that was negative for significant abnormality did have a pulmonary nodule noted to the right upper lobe.  There is minimal plaque formation noted on the CT scan        Past Medical History:   Diagnosis Date    Acute insomnia     Anxiety     Arthritis     Breast cancer (HCC) 07/2020    right breast    Caffeine use     4 cups coffee daily    Cataracts, both eyes     Depression     MDRO (multiple drug resistant organisms) resistance 10/10/2018    E. Coli urine    Overactive bladder     Skin cancer         Current Outpatient Medications   Medication Sig Dispense Refill    zolpidem (AMBIEN) 10 MG tablet Take 1 tablet by mouth nightly as needed for Sleep for up to 30 days. Max Daily Amount: 10 mg 30 tablet 0    sertraline (ZOLOFT) 100 MG tablet Take 2 tablets by mouth daily 180 tablet 1    letrozole (FEMARA) 2.5 MG tablet Take 1 tablet by mouth      gabapentin (NEURONTIN) 100 MG capsule Take 1 capsule by mouth at bedtime for 90 days. Intended supply: 30 days Max Daily Amount: 100 mg (Patient not taking: Reported on 3/18/2025) 30 capsule 2     No current facility-administered medications for this visit.     No Known Allergies    Subjective:      Review of Systems   Constitutional:

## 2025-03-20 ENCOUNTER — HOSPITAL ENCOUNTER (OUTPATIENT)
Age: 65
Discharge: HOME | End: 2025-03-20
Payer: COMMERCIAL

## 2025-03-20 DIAGNOSIS — R07.9 CHEST PAIN, UNSPECIFIED TYPE: ICD-10-CM

## 2025-03-20 LAB
ALBUMIN SERPL-MCNC: 4.7 G/DL (ref 3.5–5.2)
ALBUMIN/GLOB SERPL: 2 {RATIO} (ref 1–2.5)
ALP SERPL-CCNC: 57 U/L (ref 35–104)
ALT SERPL-CCNC: 19 U/L (ref 10–35)
ANION GAP SERPL CALCULATED.3IONS-SCNC: 11 MMOL/L (ref 9–16)
AST SERPL-CCNC: 27 U/L (ref 10–35)
BILIRUB SERPL-MCNC: 0.4 MG/DL (ref 0–1.2)
BUN SERPL-MCNC: 11 MG/DL (ref 8–23)
CALCIUM SERPL-MCNC: 9.7 MG/DL (ref 8.6–10.4)
CHLORIDE SERPL-SCNC: 104 MMOL/L (ref 98–107)
CO2 SERPL-SCNC: 25 MMOL/L (ref 20–31)
CREAT SERPL-MCNC: 0.9 MG/DL (ref 0.6–0.9)
D DIMER PPP FEU-MCNC: <0.27 UG/ML FEU (ref 0–0.59)
ERYTHROCYTE [DISTWIDTH] IN BLOOD BY AUTOMATED COUNT: 13.5 % (ref 11.8–14.4)
GFR, ESTIMATED: 71 ML/MIN/1.73M2
GLUCOSE SERPL-MCNC: 126 MG/DL (ref 74–99)
HCT VFR BLD AUTO: 37.7 % (ref 36.3–47.1)
HGB BLD-MCNC: 12.1 G/DL (ref 11.9–15.1)
LIPASE SERPL-CCNC: 47 U/L (ref 13–60)
MCH RBC QN AUTO: 31.2 PG (ref 25.2–33.5)
MCHC RBC AUTO-ENTMCNC: 32.1 G/DL (ref 28.4–34.8)
MCV RBC AUTO: 97.2 FL (ref 82.6–102.9)
NRBC BLD-RTO: 0 PER 100 WBC
PLATELET # BLD AUTO: 297 K/UL (ref 138–453)
PMV BLD AUTO: 10.3 FL (ref 8.1–13.5)
POTASSIUM SERPL-SCNC: 4.2 MMOL/L (ref 3.7–5.3)
PROT SERPL-MCNC: 7.1 G/DL (ref 6.6–8.7)
RBC # BLD AUTO: 3.88 M/UL (ref 3.95–5.11)
SODIUM SERPL-SCNC: 140 MMOL/L (ref 136–145)
TROPONIN I SERPL HS-MCNC: <6 NG/L (ref 0–14)
WBC OTHER # BLD: 6.5 K/UL (ref 3.5–11.3)

## 2025-03-20 PROCEDURE — 84484 ASSAY OF TROPONIN QUANT: CPT

## 2025-03-20 PROCEDURE — 83690 ASSAY OF LIPASE: CPT

## 2025-03-20 PROCEDURE — 85027 COMPLETE CBC AUTOMATED: CPT

## 2025-03-20 PROCEDURE — 36415 COLL VENOUS BLD VENIPUNCTURE: CPT

## 2025-03-20 PROCEDURE — 80053 COMPREHEN METABOLIC PANEL: CPT

## 2025-03-20 PROCEDURE — 85379 FIBRIN DEGRADATION QUANT: CPT

## 2025-03-21 ENCOUNTER — RESULTS FOLLOW-UP (OUTPATIENT)
Dept: FAMILY MEDICINE CLINIC | Age: 65
End: 2025-03-21

## 2025-03-25 ENCOUNTER — RESULTS FOLLOW-UP (OUTPATIENT)
Dept: FAMILY MEDICINE CLINIC | Age: 65
End: 2025-03-25

## 2025-03-25 ENCOUNTER — HOSPITAL ENCOUNTER (OUTPATIENT)
Age: 65
Discharge: HOME | End: 2025-03-27
Payer: COMMERCIAL

## 2025-03-25 VITALS — BODY MASS INDEX: 22.71 KG/M2 | HEIGHT: 64 IN | WEIGHT: 133 LBS

## 2025-03-25 DIAGNOSIS — R07.9 CHEST PAIN, UNSPECIFIED TYPE: ICD-10-CM

## 2025-03-25 LAB
ECHO AO ROOT DIAM: 3.1 CM
ECHO AO ROOT INDEX: 1.89 CM/M2
ECHO AV AREA PEAK VELOCITY: 2.4 CM2
ECHO AV AREA VTI: 2.8 CM2
ECHO AV AREA/BSA PEAK VELOCITY: 1.5 CM2/M2
ECHO AV AREA/BSA VTI: 1.7 CM2/M2
ECHO AV MEAN GRADIENT: 3 MMHG
ECHO AV MEAN VELOCITY: 0.8 M/S
ECHO AV PEAK GRADIENT: 5 MMHG
ECHO AV PEAK VELOCITY: 1.1 M/S
ECHO AV VELOCITY RATIO: 0.82
ECHO AV VTI: 19.5 CM
ECHO BSA: 1.65 M2
ECHO EST RA PRESSURE: 3 MMHG
ECHO LA AREA 2C: 15.2 CM2
ECHO LA AREA 4C: 11.5 CM2
ECHO LA DIAMETER INDEX: 2.2 CM/M2
ECHO LA DIAMETER: 3.6 CM
ECHO LA MAJOR AXIS: 4.3 CM
ECHO LA MINOR AXIS: 5.2 CM
ECHO LA TO AORTIC ROOT RATIO: 1.16
ECHO LA VOL BP: 33 ML (ref 22–52)
ECHO LA VOL MOD A2C: 35 ML (ref 22–52)
ECHO LA VOL MOD A4C: 25 ML (ref 22–52)
ECHO LA VOL/BSA BIPLANE: 20 ML/M2 (ref 16–34)
ECHO LA VOLUME INDEX MOD A2C: 21 ML/M2 (ref 16–34)
ECHO LA VOLUME INDEX MOD A4C: 15 ML/M2 (ref 16–34)
ECHO LV E' LATERAL VELOCITY: 9.36 CM/S
ECHO LV E' SEPTAL VELOCITY: 8.38 CM/S
ECHO LV EF PHYSICIAN: 55 %
ECHO LV FRACTIONAL SHORTENING: 32 % (ref 28–44)
ECHO LV INTERNAL DIMENSION DIASTOLE INDEX: 2.87 CM/M2
ECHO LV INTERNAL DIMENSION DIASTOLIC: 4.7 CM (ref 3.9–5.3)
ECHO LV INTERNAL DIMENSION SYSTOLIC INDEX: 1.95 CM/M2
ECHO LV INTERNAL DIMENSION SYSTOLIC: 3.2 CM
ECHO LV IVSD: 1 CM (ref 0.6–0.9)
ECHO LV MASS 2D: 164.5 G (ref 67–162)
ECHO LV MASS INDEX 2D: 100.3 G/M2 (ref 43–95)
ECHO LV POSTERIOR WALL DIASTOLIC: 1 CM (ref 0.6–0.9)
ECHO LV RELATIVE WALL THICKNESS RATIO: 0.43
ECHO LVOT AREA: 2.8 CM2
ECHO LVOT AV VTI INDEX: 0.98
ECHO LVOT DIAM: 1.9 CM
ECHO LVOT MEAN GRADIENT: 2 MMHG
ECHO LVOT PEAK GRADIENT: 3 MMHG
ECHO LVOT PEAK VELOCITY: 0.9 M/S
ECHO LVOT STROKE VOLUME INDEX: 33.2 ML/M2
ECHO LVOT SV: 54.4 ML
ECHO LVOT VTI: 19.2 CM
ECHO MV A VELOCITY: 0.95 M/S
ECHO MV E DECELERATION TIME (DT): 169 MS
ECHO MV E VELOCITY: 0.84 M/S
ECHO MV E/A RATIO: 0.88
ECHO MV E/E' LATERAL: 8.97
ECHO MV E/E' RATIO (AVERAGED): 9.5
ECHO MV E/E' SEPTAL: 10.02
ECHO RA AREA 4C: 12.6 CM2
ECHO RA END SYSTOLIC VOLUME APICAL 4 CHAMBER INDEX BSA: 16 ML/M2
ECHO RA VOLUME: 27 ML
ECHO RIGHT VENTRICULAR SYSTOLIC PRESSURE (RVSP): 27 MMHG
ECHO RV TAPSE: 2.4 CM (ref 1.7–?)
ECHO TV REGURGITANT MAX VELOCITY: 2.46 M/S
ECHO TV REGURGITANT PEAK GRADIENT: 19 MMHG

## 2025-03-25 PROCEDURE — 93306 TTE W/DOPPLER COMPLETE: CPT

## 2025-03-25 PROCEDURE — 93306 TTE W/DOPPLER COMPLETE: CPT | Performed by: INTERNAL MEDICINE

## 2025-04-01 DIAGNOSIS — N32.81 OVERACTIVE BLADDER: ICD-10-CM

## 2025-04-01 DIAGNOSIS — N32.81 OVERACTIVE BLADDER: Primary | ICD-10-CM

## 2025-04-01 NOTE — TELEPHONE ENCOUNTER
Pt called back and after I got Gemtesa approved it will still be over $100.00 a month can we try goodrx

## 2025-04-01 NOTE — TELEPHONE ENCOUNTER
Last seen 3/10/25. Due back based on response to Gemtesa. Called in to say Gemtesa 75 mg samples were helpful and she would like it called in.

## 2025-04-18 DIAGNOSIS — G47.00 ACUTE INSOMNIA: Primary | ICD-10-CM

## 2025-04-18 RX ORDER — ZOLPIDEM TARTRATE 10 MG/1
10 TABLET ORAL NIGHTLY PRN
Qty: 30 TABLET | Refills: 0 | Status: SHIPPED | OUTPATIENT
Start: 2025-04-18 | End: 2025-05-18

## 2025-04-18 NOTE — TELEPHONE ENCOUNTER
Layla Hayden is calling to request a refill on the following medication(s):    Last Visit Date (If Applicable):  3/18/2025    Next Visit Date:    6/23/2025    Medication Request:  Requested Prescriptions     Pending Prescriptions Disp Refills    zolpidem (AMBIEN) 10 MG tablet       Sig: Take by mouth nightly as needed for Sleep for up to 30 days.

## 2025-05-15 DIAGNOSIS — G47.00 ACUTE INSOMNIA: ICD-10-CM

## 2025-05-15 RX ORDER — ZOLPIDEM TARTRATE 10 MG/1
10 TABLET ORAL NIGHTLY PRN
Qty: 30 TABLET | Refills: 0 | Status: SHIPPED | OUTPATIENT
Start: 2025-05-15 | End: 2025-06-14

## 2025-05-15 NOTE — TELEPHONE ENCOUNTER
Layla Hayden is calling to request a refill on the following medication(s):    Medication Request:  Requested Prescriptions     Pending Prescriptions Disp Refills    zolpidem (AMBIEN) 10 MG tablet 30 tablet 0     Sig: Take 1 tablet by mouth nightly as needed for Sleep for up to 30 days. Max Daily Amount: 10 mg       Last Visit Date (If Applicable):  3/18/2025    Next Visit Date:    6/23/2025

## 2025-05-20 ENCOUNTER — HOSPITAL ENCOUNTER (OUTPATIENT)
Dept: CT IMAGING | Age: 65
Discharge: HOME | End: 2025-05-22
Attending: INTERNAL MEDICINE
Payer: COMMERCIAL

## 2025-05-20 VITALS — SYSTOLIC BLOOD PRESSURE: 107 MMHG | HEART RATE: 65 BPM | DIASTOLIC BLOOD PRESSURE: 55 MMHG

## 2025-05-20 DIAGNOSIS — R07.9 CHEST PAIN, UNSPECIFIED TYPE: ICD-10-CM

## 2025-05-20 PROCEDURE — 6360000004 HC RX CONTRAST MEDICATION: Performed by: INTERNAL MEDICINE

## 2025-05-20 PROCEDURE — 2500000003 HC RX 250 WO HCPCS: Performed by: INTERNAL MEDICINE

## 2025-05-20 PROCEDURE — 2580000003 HC RX 258: Performed by: INTERNAL MEDICINE

## 2025-05-20 PROCEDURE — 6370000000 HC RX 637 (ALT 250 FOR IP): Performed by: INTERNAL MEDICINE

## 2025-05-20 PROCEDURE — 75574 CT ANGIO HRT W/3D IMAGE: CPT

## 2025-05-20 PROCEDURE — 82565 ASSAY OF CREATININE: CPT

## 2025-05-20 RX ORDER — NITROGLYCERIN 0.4 MG/1
0.4 TABLET SUBLINGUAL ONCE
Status: COMPLETED | OUTPATIENT
Start: 2025-05-20 | End: 2025-05-20

## 2025-05-20 RX ORDER — METOPROLOL TARTRATE 50 MG
100 TABLET ORAL ONCE
Status: COMPLETED | OUTPATIENT
Start: 2025-05-20 | End: 2025-05-20

## 2025-05-20 RX ORDER — 0.9 % SODIUM CHLORIDE 0.9 %
100 INTRAVENOUS SOLUTION INTRAVENOUS ONCE
Status: COMPLETED | OUTPATIENT
Start: 2025-05-20 | End: 2025-05-20

## 2025-05-20 RX ORDER — SODIUM CHLORIDE 0.9 % (FLUSH) 0.9 %
10 SYRINGE (ML) INJECTION PRN
Status: DISCONTINUED | OUTPATIENT
Start: 2025-05-20 | End: 2025-05-23 | Stop reason: HOSPADM

## 2025-05-20 RX ORDER — IOPAMIDOL 755 MG/ML
100 INJECTION, SOLUTION INTRAVASCULAR
Status: COMPLETED | OUTPATIENT
Start: 2025-05-20 | End: 2025-05-20

## 2025-05-20 RX ADMIN — SODIUM CHLORIDE, PRESERVATIVE FREE 10 ML: 5 INJECTION INTRAVENOUS at 10:44

## 2025-05-20 RX ADMIN — SODIUM CHLORIDE 100 ML: 9 INJECTION, SOLUTION INTRAVENOUS at 10:44

## 2025-05-20 RX ADMIN — METOPROLOL 100 MG: 100 TABLET ORAL at 09:42

## 2025-05-20 RX ADMIN — NITROGLYCERIN 0.4 MG: 0.4 TABLET, ORALLY DISINTEGRATING SUBLINGUAL at 10:42

## 2025-05-20 RX ADMIN — IOPAMIDOL 100 ML: 755 INJECTION, SOLUTION INTRAVENOUS at 10:44

## 2025-05-22 LAB
EGFR, POC: 81 ML/MIN/1.73M2
POC CREATININE: 0.8 MG/DL (ref 0.51–1.19)

## 2025-06-13 DIAGNOSIS — G47.00 ACUTE INSOMNIA: ICD-10-CM

## 2025-06-13 RX ORDER — ZOLPIDEM TARTRATE 10 MG/1
10 TABLET ORAL NIGHTLY PRN
Qty: 30 TABLET | Refills: 0 | Status: SHIPPED | OUTPATIENT
Start: 2025-06-13 | End: 2025-07-13

## 2025-06-23 ENCOUNTER — OFFICE VISIT (OUTPATIENT)
Dept: FAMILY MEDICINE CLINIC | Age: 65
End: 2025-06-23
Payer: COMMERCIAL

## 2025-06-23 VITALS
BODY MASS INDEX: 22.98 KG/M2 | DIASTOLIC BLOOD PRESSURE: 70 MMHG | SYSTOLIC BLOOD PRESSURE: 108 MMHG | OXYGEN SATURATION: 97 % | WEIGHT: 134.6 LBS | HEART RATE: 79 BPM | TEMPERATURE: 97.6 F | HEIGHT: 64 IN

## 2025-06-23 DIAGNOSIS — R91.1 PULMONARY NODULE: ICD-10-CM

## 2025-06-23 DIAGNOSIS — Z13.1 DIABETES MELLITUS SCREENING: ICD-10-CM

## 2025-06-23 DIAGNOSIS — Z13.220 LIPID SCREENING: ICD-10-CM

## 2025-06-23 DIAGNOSIS — M79.89 LEFT LEG SWELLING: Primary | ICD-10-CM

## 2025-06-23 DIAGNOSIS — F32.A ANXIETY AND DEPRESSION: ICD-10-CM

## 2025-06-23 DIAGNOSIS — F41.9 ANXIETY AND DEPRESSION: ICD-10-CM

## 2025-06-23 PROCEDURE — 1123F ACP DISCUSS/DSCN MKR DOCD: CPT | Performed by: NURSE PRACTITIONER

## 2025-06-23 PROCEDURE — 1159F MED LIST DOCD IN RCRD: CPT | Performed by: NURSE PRACTITIONER

## 2025-06-23 PROCEDURE — 99214 OFFICE O/P EST MOD 30 MIN: CPT | Performed by: NURSE PRACTITIONER

## 2025-06-23 ASSESSMENT — ENCOUNTER SYMPTOMS
SORE THROAT: 0
DIARRHEA: 0
SINUS PAIN: 0
VOMITING: 0
SHORTNESS OF BREATH: 0
ABDOMINAL PAIN: 0
COUGH: 0
NAUSEA: 0

## 2025-06-23 NOTE — PROGRESS NOTES
[facility]  [facility]  [address] [address]  Dept: [phone]  Dept Fax: [phone]    Lyala Hayden is a 65 y.o. female who presents today for her medicalconditions/complaints as noted below.  Layla Hayden is c/o of Anxiety, Depression, and Foot Pain (Left foot pain and swelling x's 3 months )      HPI:     65 y.o female presents with f/u    3 months, left foot and left low leg swelling - has noticed mild swelling only to left lower     Chronic low back pain mild arthritis noted on xrays to low back and pelvis. No meds tried. No acute injury or trauma. No saddle anesthesia, no loss of bladder or bowel control. - stable     Ct chest showing pulmonary nodule, due for monitoring. Has seen pulm Dr. Mckeon - will repeat in July 2025     History of insomnia treats with Ambien nightly - stable     History of anxiety and depression treats with Zoloft 200 mg      History of overactive bladder treats with tried oxybutynin, myrbetriq will d/c- trial gemtesa     History of breast cancer takes Femara, follows with [facility], has seen oncology no adjustments needed. Last sugar negative March 2025          Past Medical History:   Diagnosis Date    Acute insomnia     Anxiety     Arthritis     Breast cancer (HCC) 07/2020    right breast    Caffeine use     4 cups coffee daily    Cataracts, both eyes     Depression     MDRO (multiple drug resistant organisms) resistance 10/10/2018    E. Coli urine    Overactive bladder     Skin cancer         Current Outpatient Medications   Medication Sig Dispense Refill    zolpidem (AMBIEN) 10 MG tablet Take 1 tablet by mouth nightly as needed for Sleep for up to 30 days. Max Daily Amount: 10 mg 30 tablet 0    vibegron (GEMTESA) 75 MG TABS tablet Take 1 tablet by mouth daily 30 tablet 11    sertraline (ZOLOFT) 100 MG tablet Take 2 tablets by mouth daily 180 tablet 1    letrozole (FEMARA) 2.5 MG tablet Take 1 tablet by mouth

## 2025-06-24 ENCOUNTER — TELEPHONE (OUTPATIENT)
Dept: FAMILY MEDICINE CLINIC | Age: 65
End: 2025-06-24

## 2025-06-24 DIAGNOSIS — M79.89 LEFT LEG SWELLING: Primary | ICD-10-CM

## 2025-06-24 NOTE — TELEPHONE ENCOUNTER
[facility] called and they are needing the Vascular duplex reflux standing venous insufficiency study bilateral study changed. They stated that it can not say standing in it

## 2025-06-30 ENCOUNTER — HOSPITAL ENCOUNTER (OUTPATIENT)
Age: 65
Setting detail: SPECIMEN
Discharge: HOME | End: 2025-06-30

## 2025-06-30 DIAGNOSIS — Z13.220 LIPID SCREENING: ICD-10-CM

## 2025-06-30 DIAGNOSIS — Z13.1 DIABETES MELLITUS SCREENING: ICD-10-CM

## 2025-06-30 LAB
CHOLEST SERPL-MCNC: 225 MG/DL (ref 0–199)
CHOLESTEROL/HDL RATIO: 2.5
EST. AVERAGE GLUCOSE BLD GHB EST-MCNC: 103 MG/DL
HBA1C MFR BLD: 5.2 % (ref 4–6)
HDLC SERPL-MCNC: 90 MG/DL
LDLC SERPL CALC-MCNC: 122 MG/DL (ref 0–100)
TRIGL SERPL-MCNC: 64 MG/DL
VLDLC SERPL CALC-MCNC: 13 MG/DL (ref 1–30)

## 2025-07-01 ENCOUNTER — RESULTS FOLLOW-UP (OUTPATIENT)
Dept: FAMILY MEDICINE CLINIC | Age: 65
End: 2025-07-01

## 2025-07-08 ENCOUNTER — HOSPITAL ENCOUNTER (OUTPATIENT)
Dept: CT IMAGING | Age: 65
Discharge: HOME | End: 2025-07-10
Payer: COMMERCIAL

## 2025-07-08 ENCOUNTER — HOSPITAL ENCOUNTER (OUTPATIENT)
Dept: VASCULAR LAB | Age: 65
Discharge: HOME | End: 2025-07-10
Payer: COMMERCIAL

## 2025-07-08 DIAGNOSIS — M79.89 LEFT LEG SWELLING: ICD-10-CM

## 2025-07-08 DIAGNOSIS — R91.1 PULMONARY NODULE: ICD-10-CM

## 2025-07-08 LAB
VAS LEFT CFV DIAM: 9.4 MM
VAS LEFT CFV RFX: 0.4 S
VAS LEFT FV DIST DIAM: 7.4 MM
VAS LEFT FV MID DIAM: 7.1 MM
VAS LEFT FV MID RFX: 0.3 S
VAS LEFT FV PROX DIAM: 5.4 MM
VAS LEFT POP RFX: 0.5 S
VAS LEFT POPLITEAL VEIN DIAM: 9.2 MM
VAS LEFT SSV MID DIAM: 1.3 MM
VAS RIGHT CFV DIAM: 11.3 MM
VAS RIGHT CFV RFX: 0.2 S
VAS RIGHT FV DIST DIAM: 6.4 MM
VAS RIGHT FV MID DIAM: 6.5 MM
VAS RIGHT FV MID RFX: 0.2 S
VAS RIGHT FV PROX DIAM: 5.6 MM
VAS RIGHT GSV AK RFX: 4.4 S
VAS RIGHT GSV AT KNEE DIAM: 1.5 MM
VAS RIGHT GSV BK DIST DIAM: 1 MM
VAS RIGHT GSV BK DIST RFX: 0.2 S
VAS RIGHT GSV BK MID DIAM: 1.5 MM
VAS RIGHT GSV BK MID RFX: 0.4 S
VAS RIGHT GSV JUNC DIAM: 3.6 MM
VAS RIGHT GSV JUNC RFX: 0.2 S
VAS RIGHT GSV THIGH MID DIAM: 1.6 MM
VAS RIGHT GSV THIGH MID RFX: 0.2 S
VAS RIGHT GSV THIGH PROX DIAM: 1.7 MM
VAS RIGHT GSV THIGH PROX RFX: 0.1 S
VAS RIGHT POP RFX: 0.3 S
VAS RIGHT POPLITEAL VEIN DIAM: 7.6 MM
VAS RIGHT SSV MID DIAM: 2.2 MM
VAS RIGHT SSV MID RFX: 0.1 S
VAS RIGHT SSV PROX DIAM: 0.9 MM

## 2025-07-08 PROCEDURE — 93970 EXTREMITY STUDY: CPT

## 2025-07-08 PROCEDURE — 71250 CT THORAX DX C-: CPT

## 2025-07-08 PROCEDURE — 93970 EXTREMITY STUDY: CPT | Performed by: SURGERY

## 2025-07-09 DIAGNOSIS — I83.893 VARICOSE VEINS OF BOTH LEGS WITH EDEMA: Primary | ICD-10-CM

## 2025-07-15 DIAGNOSIS — G47.00 ACUTE INSOMNIA: Primary | ICD-10-CM

## 2025-07-15 RX ORDER — ZOLPIDEM TARTRATE 10 MG/1
10 TABLET ORAL NIGHTLY PRN
Qty: 30 TABLET | Refills: 0 | Status: SHIPPED | OUTPATIENT
Start: 2025-07-15 | End: 2025-08-14

## 2025-07-17 DIAGNOSIS — R91.1 PULMONARY NODULE: Primary | ICD-10-CM

## 2025-08-14 DIAGNOSIS — G47.00 ACUTE INSOMNIA: ICD-10-CM

## 2025-08-14 RX ORDER — ZOLPIDEM TARTRATE 10 MG/1
10 TABLET ORAL NIGHTLY PRN
Qty: 30 TABLET | Refills: 0 | Status: SHIPPED | OUTPATIENT
Start: 2025-08-14 | End: 2025-09-13

## (undated) DEVICE — STAZ ENDO KIT: Brand: MEDLINE INDUSTRIES, INC.